# Patient Record
Sex: MALE | Race: WHITE | NOT HISPANIC OR LATINO | Employment: OTHER | URBAN - METROPOLITAN AREA
[De-identification: names, ages, dates, MRNs, and addresses within clinical notes are randomized per-mention and may not be internally consistent; named-entity substitution may affect disease eponyms.]

---

## 2018-04-10 ENCOUNTER — OFFICE VISIT CONVERTED (OUTPATIENT)
Dept: CARDIOLOGY | Facility: CLINIC | Age: 73
End: 2018-04-10
Attending: SPECIALIST

## 2018-10-12 ENCOUNTER — OFFICE VISIT CONVERTED (OUTPATIENT)
Dept: CARDIOLOGY | Facility: CLINIC | Age: 73
End: 2018-10-12
Attending: SPECIALIST

## 2019-04-12 ENCOUNTER — OFFICE VISIT CONVERTED (OUTPATIENT)
Dept: CARDIOLOGY | Facility: CLINIC | Age: 74
End: 2019-04-12
Attending: SPECIALIST

## 2019-04-12 ENCOUNTER — CONVERSION ENCOUNTER (OUTPATIENT)
Dept: OTHER | Facility: HOSPITAL | Age: 74
End: 2019-04-12

## 2019-10-15 ENCOUNTER — CONVERSION ENCOUNTER (OUTPATIENT)
Dept: CARDIOLOGY | Facility: CLINIC | Age: 74
End: 2019-10-15

## 2019-10-15 ENCOUNTER — OFFICE VISIT CONVERTED (OUTPATIENT)
Dept: CARDIOLOGY | Facility: CLINIC | Age: 74
End: 2019-10-15
Attending: SPECIALIST

## 2020-07-17 ENCOUNTER — OFFICE VISIT CONVERTED (OUTPATIENT)
Dept: CARDIOLOGY | Facility: CLINIC | Age: 75
End: 2020-07-17
Attending: SPECIALIST

## 2020-11-19 ENCOUNTER — OFFICE VISIT CONVERTED (OUTPATIENT)
Dept: UROLOGY | Facility: CLINIC | Age: 75
End: 2020-11-19
Attending: NURSE PRACTITIONER

## 2021-01-21 ENCOUNTER — HOSPITAL ENCOUNTER (OUTPATIENT)
Dept: OTHER | Facility: HOSPITAL | Age: 76
Discharge: HOME | End: 2021-01-21
Attending: INTERNAL MEDICINE

## 2021-02-18 ENCOUNTER — HOSPITAL ENCOUNTER (OUTPATIENT)
Dept: VACCINE CLINIC | Facility: HOSPITAL | Age: 76
Discharge: HOME | End: 2021-02-18
Attending: INTERNAL MEDICINE

## 2021-03-04 ENCOUNTER — CONVERSION ENCOUNTER (OUTPATIENT)
Dept: SURGERY | Facility: CLINIC | Age: 76
End: 2021-03-04

## 2021-03-04 ENCOUNTER — OFFICE VISIT CONVERTED (OUTPATIENT)
Dept: UROLOGY | Facility: CLINIC | Age: 76
End: 2021-03-04
Attending: NURSE PRACTITIONER

## 2021-03-04 LAB
BILIRUB UR QL STRIP: NEGATIVE
COLOR UR: YELLOW
CONV BACTERIA IN URINE MICRO: 0
CONV CALCIUM OXALATE CRYSTALS /HPF IN URINE SEDIMENT BY MICROSCOPY: 0
CONV CLARITY OF URINE: CLEAR
CONV PROTEIN IN URINE BY AUTOMATED TEST STRIP: NEGATIVE
CONV UROBILINOGEN IN URINE BY AUTOMATED TEST STRIP: 0.2
GLUCOSE UR QL: NEGATIVE
HGB UR QL STRIP: NEGATIVE
KETONES UR QL STRIP: NEGATIVE
LEUKOCYTE ESTERASE UR QL STRIP: NEGATIVE
NITRITE UR QL STRIP: NEGATIVE
PH UR STRIP.AUTO: 6 [PH]
RBC #/AREA URNS HPF: 0 /[HPF]
RENAL EPI CELLS #/AREA URNS HPF: 0 /[HPF]
SP GR UR: >=1.03
SQUAMOUS SPT QL MICRO: 0
WBC #/AREA URNS HPF: 0 /[HPF]

## 2021-05-10 NOTE — H&P
History and Physical      Patient Name: Jordan Gastelum   Patient ID: [id number]   Sex: Male   YOB: 1945    Primary Care Provider: Zuri MATHIS   Referring Provider: Zuri MATHIS    Visit Date: November 19, 2020    Provider: FELICIANO Philip   Location: [facility]   Location Address: [address] [place] [address]  [id number]   Location Phone: [phone]          Chief Complaint  · pt here for urological concerns      History Of Present Illness  The patient is a 75 year old /White male, who presents on referral from Zuri MATHIS, for an urological evaluation for an elevated PSA. The PSA was noted as elevated approximately 5 months ago and stated to be at a level of 8.3 . A repeat PSA on 10/27/2020 is still elevated at 7.2 ng/dl.   The patient also reports slowing of his stream and nocturia, 3-4 times a night. Additionally, he denies burning, frequency, hematuria, hesitancy, and incomplete emptying.   The patient's current medications are notable for Flomax.   The patient's past medical history is notable for UTI's.   Petinent studies:  To date, no diagnostic studies have been performed. He has not had any recent care for this condition.      She had had a severe urinary tract infection in 6/3/2020 which landed him in the emergency department.  The patient received IV antibiotics and oral antibiotics x14 days for this infection.    The patient's PSA level was checked on 6/25/2020 which revealed a level of 8.3 ng/mL    His PSA level was rechecked again on 9/24/2020 which revealed a value of 6.4 ng/mL.  Another PSA level was checked a month later on 10/27/2020 which was 7.2 ng/mL.           Past Medical History  Disease Name Date Onset Notes   Diabetes --  --    Heart Disease --  --    High blood pressure --  --          Past Surgical History  Procedure Name Date Notes   *Metal Implant --  --          Medication List  Name Date Started Instructions    aspirin 81 mg oral tablet,delayed release (DR/EC)  take 1 tablet (81 mg) by oral route once daily   atorvastatin 80 mg oral tablet  take 1 tablet (80 mg) by oral route once daily   benazepril 40 mg oral tablet  take 1 tablet (40 mg) by oral route once daily   carvedilol 12.5 mg oral tablet  take 1 tablet (12.5 mg) by oral route 2 times per day with food   CoQ-10 100 mg oral capsule  take 1 capsule by oral route daily   Fish Oil oral  --    metformin 850 mg oral tablet  take 1 tablet (850 mg) by oral route 2 times per day with morning and evening meals   multivitamin oral  --    tamsulosin 0.4 mg oral capsule  take 1 capsule (0.4 mg) by oral route once daily 1/2 hour following the same meal each day         Allergy List  Allergen Name Date Reaction Notes   prednisone --  --  depression       Allergies Reconciled  Family Medical History  Disease Name Relative/Age Notes   Diabetes, unspecified type Grandfather (maternal)/  Mother/   Mother; Grandfather (maternal); Aunt (maternal)   Family history of breast cancer  Aunt         Social History  Finding Status Start/Stop Quantity Notes   Alcohol Current some day --/-- --  drinks rarely   Caffeine Current some day --/-- --  drinks occasionally   Second hand smoke exposure Never --/-- --  no   Tobacco Former [age] --  former smoker; started smoking at age 22; quit smoking at age 32; smoked 1 cigarette(s) per day         Review of Systems  · Constitutional  o Denies  o : fever, chills  · HENT  o Denies  o : sore throat, nasal congestion, nasal discharge, sinus pain, headaches  · Cardiovascular  o Denies  o : chest pain, cardiac murmurs, irregular heart beats, dyspnea on exertion  · Respiratory  o Denies  o : shortness of breath, wheezing  · Gastrointestinal  o Denies  o : nausea, vomiting, change in abdominal girth, diarrhea, constipation, blood in stools  · Genitourinary  o Denies  o : additional symptoms, except as noted in HPI  · Integument  o Denies  o : rash, itching,  "new skin lesions  · Neurologic  o Denies  o : tingling or numbness, incoordination, seizures  · Endocrine  o Denies  o : polyuria, polydipsia, cold intolerance, heat intolerance, weight gain, weight loss  · Psychiatric  o Denies  o : anxiety, depression      Vitals  Date Time BP Position Site L\R Cuff Size HR RR TEMP (F) WT  HT  BMI kg/m2 BSA m2 O2 Sat FR L/min FiO2        11/19/2020 09:44 AM       16  252lbs 16oz 5'  9.5\" 36.83 2.37             Physical Examination  · Constitutional  o Appearance  o : Well nourished, well developed patient in no acute distress. Ambulating without difficulty.  · Head and Face  o Head  o :   § Inspection  § : atraumatic, normocephalic  o Face  o :   § Inspection  § : no facial lesions  · Eyes  o Sclerae  o : sclerae white  · Ears, Nose, Mouth and Throat  o Ears  o :   § External Ears  § : appearance within normal limits, no lesions present  o Nose  o :   § External Nose  § : appearance normal  · Neck  o Inspection/Palpation  o : normal appearance, trachea midline  · Respiratory  o Respiratory Effort  o : Breathing is unlabored without accessory muscle use  o Inspection of Chest  o : normal appearance, no retractions  · Genitourinary  o Digital Rectal Examination  o :   § Tone and Masses  § : normal sphincter tone, no rectal masses present  § Prostate  § : nontender to palpation, size normal, no nodules present, boggy consistency.  § Seminal Vesicles  § : normal size and symmetry without masses or tenderness  · Skin and Subcutaneous Tissue  o General Inspection  o : No rashes, lesions or areas of discoloration present. Skin turgor is normal.  o General Palpation  o : No abnormalities, masses or tenderness on palpation.  · Neurologic  o Mental Status Examination  o :   § Orientation  § : grossly oriented to person, place and time  § Speech/Language  § : communication ability within normal limits  o Gait and Station  o : normal gait, able to stand without " difficulty  · Psychiatric  o Judgement and Insight  o : judgment and insight intact, judgement for everyday activities and social situations within normal limits, insight intact  o Mood and Affect  o : mood normal, affect appropriate          Results  · In-Office Procedures  o Lab procedure  § Automated dipstick urinalysis with microscopy (86447)   § Color Ur: Yellow   § Clarity Ur: Clear   § Glucose Ur Ql Strip: Negative   § Bilirub Ur Ql Strip: Negative   § Ketones Ur Ql Strip: Negative   § Sp Gr Ur Qn: 1.020   § Hgb Ur Ql Strip: Negative   § pH Ur-LsCnc: 7.0   § Prot Ur Ql Strip: Negative   § Urobilinogen Ur Strip-mCnc: 0.2   § Nitrite Ur Ql Strip: Negative   § WBC Est Ur Ql Strip: Negative   § RBC UrnS Qn HPF: 0   § WBC UrnS Qn HPF: 0   § Bacteria UrnS Qn HPF: 0   § Crystals UrnS Qn HPF: 0   § Epithelial Cells (non renal): 0 /HPF  § Epithelial Cells (renal): 0   o Surgical procedure  § IOP - Bladder Scan/Residual Urine (26029)   § Specimen vol Ur: 0       Assessment  · Elevated prostate specific antigen (PSA)     790.93/R97.20      Plan  · Medications  o doxycycline hyclate 100 mg oral capsule   SIG: take 1 capsule (100 mg) by oral route 2 times per day for 28 days   DISP: (56) Capsule with 0 refills  Prescribed on 11/19/2020     o Florajen3 460 mg (7.5-6- 1.5 bill. cell) oral capsule   SIG: take 1 capsule by oral route 2 times a day for 28 days   DISP: (56) Capsule with 0 refills  Prescribed on 11/19/2020     o finasteride 5 mg oral tablet   SIG: take 1 tablet (5 mg) by oral route once daily for 90 days   DISP: (90) Tablet with 4 refills  Prescribed on 11/19/2020     o Medications have been Reconciled  o Transition of Care or Provider Policy  · Instructions  o DISCUSSION AND PLAN:  o The patient's PSA is elevated on initial exam. I have discussed the causes of an elevated PSA. I have made recommendations and I have the patient return in follow-up for a recheck of the PSA.  o We will treat patient for  prostatitis and recheck PSA in 6 months will f/u in office in 3 months to evaluate symptoms or sooner if needed.  o Repeat PSA in 6 months.  o Electronically Identified Patient Education Materials Provided Electronically            Electronically Signed by: FELICIANO Philip -Author on November 19, 2020 10:32:48 AM

## 2021-05-13 NOTE — PROGRESS NOTES
"   Progress Note      Patient Name: Jordan Gastelum   Patient ID: [id number]   Sex: Male   YOB: 1945    Primary Care Provider: Zuri MATHIS   Referring Provider: Zuri MATHIS    Visit Date: July 17, 2020    Provider: Chacorta Streeter MD   Location: [facility]   Location Address: [address] [place] [address], [facility]   [place]  [id number]   Location Phone: [phone]          Chief Complaint  · PVCs  · Palpitations       History Of Present Illness  Jordan Gastelum is a 74 year old /White male with a history of palpitations and PVCs. No chest pain or shortness of breath.   CURRENT MEDICATIONS: include Metformin 850 mg b.i.d; Atorvastatin 80 mg daily; Tamsulosin 0.4 mg daily; Carvedilol 12.5 mg b.i.d.; Benazepril 40 mg daily; ASA 81 mg daily. The dosage and frequency of the medications were reviewed with the patient.   PAST MEDICAL HISTORY: Hypertension; hyperlipidemia; diabetes mellitus; palpitations; PVCs.   FAMILY HISTORY: Positive for diabetes, hypertension and heart disease.   PSYCHOSOCIAL HISTORY: No history of mood changes or depression. He rarely drinks alcohol. He previously used tobacco but quit.       Review of Systems  · Cardiovascular  o Admits  o : palpitations (fast, fluttering, or skipping beats)  o Denies  o : swelling (feet, ankles, hands), shortness of breath while walking or lying flat, chest pain or angina pectoris   · Respiratory  o Denies  o : chronic or frequent cough, asthma or wheezing      Vitals  Date Time BP Position Site L\R Cuff Size HR RR TEMP (F) WT  HT  BMI kg/m2 BSA m2 O2 Sat HC       07/17/2020 11:50 /76 Sitting    66 - R   248lbs 0oz 5'  10\" 35.58 2.36     07/17/2020 11:50 /80 Sitting                     Physical Examination  · Constitutional  o Appearance  o : Awake, alert, cooperative, pleasant.  · Respiratory  o Inspection of Chest  o : No chest wall deformities, moving equal.  o Auscultation of Lungs  o : Good " air entry with vesicular breath sounds.  · Cardiovascular  o Heart  o :   § Auscultation of Heart  § : S1 and S2 regular. No S3. No S4. No murmurs.  o Peripheral Vascular System  o :   § Extremities  § : Peripheral pulses were well felt. No edema. No cyanosis.  · Gastrointestinal  o Abdominal Examination  o : No masses or organomegaly noted.          Assessment     ASSESSMENT AND PLAN:    1.  Palpitations, PVCs, stable:  Continue current dose of Carvedilol.  2.  Essential hypertension controlled:  Continue current dose of Benazepril, managed by his PMD.  3.  See me back in 6 months.    Chacorta Streeter MD, [facility]  CASTRO/lila           This note was transcribed by Lynnette Pina.  lila/CASTRO  The above service was transcribed by Lynnette Pina, and I attest to the accuracy of the note.  CASTRO               Electronically Signed by: Lynnette Pina-, -Author on July 21, 2020 01:10:57 PM  Electronically Co-signed by: Chacorta Streeter MD -Reviewer on July 24, 2020 08:40:24 AM

## 2021-05-14 VITALS — WEIGHT: 253 LBS | BODY MASS INDEX: 37.47 KG/M2 | RESPIRATION RATE: 16 BRPM | HEIGHT: 69 IN

## 2021-05-14 VITALS — WEIGHT: 252.37 LBS | HEIGHT: 70 IN | BODY MASS INDEX: 36.13 KG/M2 | RESPIRATION RATE: 14 BRPM

## 2021-05-14 NOTE — PROGRESS NOTES
Progress Note      Patient Name: Jordan Gastelum   Patient ID: [id number]   Sex: Male   YOB: 1945    Primary Care Provider: Zuri MATHIS    Visit Date: March 4, 2021    Provider: FELICIANO Philip   Location: [facility]   Location Address: [address] [place] [address]  [id number]   Location Phone: [phone]          Chief Complaint  · pt here for urological concerns      History Of Present Illness  The patient is a 75 year old /White male , who presents for f/u on elevated PSA and prostatitis.      Patient states he is much better     His nocturia is down to 1-2x a night     he is with no hesitancy or discomfort any longer     no straining with urination     Previous:  He had had a severe urinary tract infection in 6/3/2020 which landed him in the emergency department.  The patient received IV antibiotics and oral antibiotics x14 days for this infection.    The patient's PSA level was checked on 6/25/2020 which revealed a level of 8.3 ng/mL    His PSA level was rechecked again on 9/24/2020 which revealed a value of 6.4 ng/mL.  Another PSA level was checked a month later on 10/27/2020 which was 7.2 ng/mL.           Past Medical History  Diabetes; Heart Disease; High blood pressure         Past Surgical History  *Metal Implant         Medication List  aspirin 81 mg oral tablet,delayed release (DR/EC); atorvastatin 80 mg oral tablet; benazepril 40 mg oral tablet; carvedilol 12.5 mg oral tablet; CoQ-10 100 mg oral capsule; finasteride 5 mg oral tablet; Fish Oil oral; metformin 850 mg oral tablet; multivitamin oral; tamsulosin 0.4 mg oral capsule         Allergy List  prednisone       Allergies Reconciled  Family Medical History  Diabetes, unspecified type; Family history of breast cancer         Social History  Alcohol (Current some day); Caffeine (Current some day); Second hand smoke exposure (Never); Tobacco (Former)         Review of  "Systems  · Constitutional  o Denies  o : fever, chills  · HENT  o Denies  o : sore throat, nasal congestion, nasal discharge, sinus pain, headaches  · Cardiovascular  o Denies  o : chest pain, cardiac murmurs, irregular heart beats, dyspnea on exertion  · Respiratory  o Denies  o : shortness of breath, wheezing  · Gastrointestinal  o Denies  o : nausea, vomiting, change in abdominal girth, diarrhea, constipation, blood in stools  · Genitourinary  o Denies  o : additional symptoms, except as noted in HPI  · Integument  o Denies  o : rash, itching, new skin lesions  · Neurologic  o Denies  o : tingling or numbness, incoordination, seizures  · Endocrine  o Denies  o : polyuria, polydipsia, cold intolerance, heat intolerance, weight gain, weight loss  · Psychiatric  o Denies  o : anxiety, depression      Vitals  Date Time BP Position Site L\R Cuff Size HR RR TEMP (F) WT  HT  BMI kg/m2 BSA m2 O2 Sat FR L/min FiO2        03/04/2021 08:34 AM       14  252lbs 6oz 5'  10\" 36.21 2.38             Physical Examination  · Constitutional  o Appearance  o : Well nourished, well developed patient in no acute distress. Ambulating without difficulty.  · Head and Face  o Head  o :   § Inspection  § : atraumatic, normocephalic  o Face  o :   § Inspection  § : no facial lesions  · Eyes  o Sclerae  o : sclerae white  · Ears, Nose, Mouth and Throat  o Ears  o :   § External Ears  § : appearance within normal limits, no lesions present  o Nose  o :   § External Nose  § : appearance normal  · Neck  o Inspection/Palpation  o : normal appearance, trachea midline  · Respiratory  o Respiratory Effort  o : Breathing is unlabored without accessory muscle use  o Inspection of Chest  o : normal appearance, no retractions  · Skin and Subcutaneous Tissue  o General Inspection  o : No rashes, lesions or areas of discoloration present. Skin turgor is normal.  o General Palpation  o : No abnormalities, masses or tenderness on " palpation.  · Neurologic  o Mental Status Examination  o :   § Orientation  § : grossly oriented to person, place and time  § Speech/Language  § : communication ability within normal limits  o Gait and Station  o : normal gait, able to stand without difficulty  · Psychiatric  o Judgement and Insight  o : judgment and insight intact, judgement for everyday activities and social situations within normal limits, insight intact  o Mood and Affect  o : mood normal, affect appropriate          Results  · In-Office Procedures  o Lab procedure  § Automated dipstick urinalysis with microscopy ([id number])   § Color Ur: Yellow   § Clarity Ur: Clear   § Glucose Ur Ql Strip: Negative   § Bilirub Ur Ql Strip: Negative   § Ketones Ur Ql Strip: Negative   § Sp Gr Ur Qn: >=1.030   § Hgb Ur Ql Strip: Negative   § pH Ur-LsCnc: 6.0   § Prot Ur Ql Strip: Negative   § Urobilinogen Ur Strip-mCnc: 0.2   § Nitrite Ur Ql Strip: Negative   § WBC Est Ur Ql Strip: Negative   § RBC UrnS Qn HPF: 0   § WBC UrnS Qn HPF: 0   § Bacteria UrnS Qn HPF: 0   § Crystals UrnS Qn HPF: 0   § Epithelial Cells (non renal): 0 /HPF  § Epithelial Cells (renal): 0       Assessment  · Elevated prostate specific antigen (PSA)     790.93/R97.20      Plan  · Medications  o Medications have been Reconciled  o Transition of Care or Provider Policy  · Instructions  o DISCUSSION AND PLAN:  o The patient will continue finasteride and follow up in clinic as scheduled with repeat PSA if he is with any S/S of a UTI or prostatitis he is to call the office and f/u sooner.  o Repeat PSA in 6 months.            Electronically Signed by: FELICIANO Philip -Author on March 4, 2021 09:12:28 AM

## 2021-05-15 VITALS
HEIGHT: 70 IN | HEART RATE: 59 BPM | SYSTOLIC BLOOD PRESSURE: 150 MMHG | DIASTOLIC BLOOD PRESSURE: 71 MMHG | WEIGHT: 255.37 LBS | BODY MASS INDEX: 36.56 KG/M2

## 2021-05-15 VITALS
DIASTOLIC BLOOD PRESSURE: 76 MMHG | WEIGHT: 248 LBS | HEIGHT: 70 IN | SYSTOLIC BLOOD PRESSURE: 152 MMHG | HEART RATE: 66 BPM | BODY MASS INDEX: 35.5 KG/M2

## 2021-05-15 VITALS
HEART RATE: 64 BPM | SYSTOLIC BLOOD PRESSURE: 198 MMHG | DIASTOLIC BLOOD PRESSURE: 100 MMHG | BODY MASS INDEX: 36.08 KG/M2 | WEIGHT: 252 LBS | HEIGHT: 70 IN

## 2021-05-16 VITALS
HEIGHT: 70 IN | DIASTOLIC BLOOD PRESSURE: 94 MMHG | BODY MASS INDEX: 36.79 KG/M2 | SYSTOLIC BLOOD PRESSURE: 166 MMHG | HEART RATE: 56 BPM | WEIGHT: 257 LBS

## 2021-05-16 VITALS
HEIGHT: 70 IN | HEART RATE: 56 BPM | BODY MASS INDEX: 36.65 KG/M2 | DIASTOLIC BLOOD PRESSURE: 100 MMHG | WEIGHT: 256 LBS | SYSTOLIC BLOOD PRESSURE: 172 MMHG

## 2021-08-09 ENCOUNTER — LAB (OUTPATIENT)
Dept: LAB | Facility: HOSPITAL | Age: 76
End: 2021-08-09

## 2021-08-09 ENCOUNTER — TRANSCRIBE ORDERS (OUTPATIENT)
Dept: LAB | Facility: HOSPITAL | Age: 76
End: 2021-08-09

## 2021-08-09 DIAGNOSIS — R97.20 ELEVATED PROSTATE SPECIFIC ANTIGEN (PSA): Primary | ICD-10-CM

## 2021-08-09 DIAGNOSIS — R97.20 ELEVATED PROSTATE SPECIFIC ANTIGEN (PSA): ICD-10-CM

## 2021-08-09 LAB — PSA SERPL-MCNC: 2.46 NG/ML (ref 0–4)

## 2021-08-09 PROCEDURE — 36415 COLL VENOUS BLD VENIPUNCTURE: CPT

## 2021-08-09 PROCEDURE — G0103 PSA SCREENING: HCPCS

## 2021-08-19 ENCOUNTER — OFFICE VISIT (OUTPATIENT)
Dept: UROLOGY | Facility: CLINIC | Age: 76
End: 2021-08-19

## 2021-08-19 VITALS — HEIGHT: 70 IN | BODY MASS INDEX: 36.33 KG/M2 | RESPIRATION RATE: 18 BRPM | WEIGHT: 253.8 LBS

## 2021-08-19 DIAGNOSIS — R97.20 ELEVATED PROSTATE SPECIFIC ANTIGEN (PSA): Primary | ICD-10-CM

## 2021-08-19 LAB
BILIRUB BLD-MCNC: NEGATIVE MG/DL
CLARITY, POC: CLEAR
COLOR UR: YELLOW
GLUCOSE UR STRIP-MCNC: NEGATIVE MG/DL
KETONES UR QL: NEGATIVE
LEUKOCYTE EST, POC: NEGATIVE
NITRITE UR-MCNC: NEGATIVE MG/ML
PH UR: 6 [PH] (ref 5–8)
PROT UR STRIP-MCNC: NEGATIVE MG/DL
RBC # UR STRIP: ABNORMAL /UL
SP GR UR: 1.03 (ref 1–1.03)
UROBILINOGEN UR QL: NORMAL

## 2021-08-19 PROCEDURE — 81003 URINALYSIS AUTO W/O SCOPE: CPT | Performed by: NURSE PRACTITIONER

## 2021-08-19 PROCEDURE — 99213 OFFICE O/P EST LOW 20 MIN: CPT | Performed by: NURSE PRACTITIONER

## 2021-08-19 RX ORDER — FINASTERIDE 5 MG/1
5 TABLET, FILM COATED ORAL DAILY
COMMUNITY
Start: 2021-08-09 | End: 2021-08-19 | Stop reason: SDUPTHER

## 2021-08-19 RX ORDER — BENAZEPRIL HYDROCHLORIDE 40 MG/1
40 TABLET, FILM COATED ORAL DAILY
COMMUNITY
Start: 2021-07-28

## 2021-08-19 RX ORDER — ASPIRIN 81 MG/1
TABLET ORAL
COMMUNITY

## 2021-08-19 RX ORDER — CHLORAL HYDRATE 500 MG
CAPSULE ORAL
COMMUNITY

## 2021-08-19 RX ORDER — TAMSULOSIN HYDROCHLORIDE 0.4 MG/1
CAPSULE ORAL
COMMUNITY

## 2021-08-19 RX ORDER — CARVEDILOL 12.5 MG/1
12.5 TABLET ORAL 2 TIMES DAILY
COMMUNITY
Start: 2021-06-11

## 2021-08-19 RX ORDER — MULTIPLE VITAMINS W/ MINERALS TAB 9MG-400MCG
TAB ORAL
COMMUNITY

## 2021-08-19 RX ORDER — ATORVASTATIN CALCIUM 80 MG/1
TABLET, FILM COATED ORAL
COMMUNITY

## 2021-08-19 RX ORDER — FINASTERIDE 5 MG/1
5 TABLET, FILM COATED ORAL DAILY
Qty: 90 TABLET | Refills: 3 | Status: SHIPPED | OUTPATIENT
Start: 2021-08-19 | End: 2022-02-18

## 2021-08-19 RX ORDER — BLOOD SUGAR DIAGNOSTIC
1 STRIP MISCELLANEOUS DAILY
COMMUNITY
Start: 2021-07-09

## 2021-08-19 RX ORDER — UBIDECARENONE 100 MG
CAPSULE ORAL
COMMUNITY

## 2021-08-19 NOTE — PROGRESS NOTES
"Chief Complaint: Follow-up (6 month) and Elevated PSA    Subjective         History of Present Illness  Jordan Gastelum is a 76 y.o. male presents to [facility] to be seen for Follow-up on elevated PSA.    Patient states he is with no further pain.    Nocturia x1-2    No hesitancy or straining with urination.    No UTIs or gross hematuria since we last saw him.    Most recent PSA is 2.4.    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   • OTHER SURGICAL HISTORY      broken fingers right hand, had pins   • OTHER SURGICAL HISTORY      Rt ankle, metal plate on each side, 11 screws         Current Outpatient Medications:   •  aspirin (aspirin) 81 MG EC tablet, aspirin 81 mg oral tablet,delayed release (DR/EC) take 1 tablet (81 mg) by oral route once daily   Active, Disp: , Rfl:   •  atorvastatin (LIPITOR) 80 MG tablet, atorvastatin 80 mg oral tablet take 1 tablet (80 mg) by oral route once daily   Active, Disp: , Rfl:   •  benazepril (LOTENSIN) 40 MG tablet, Take 40 mg by mouth Daily., Disp: , Rfl:   •  carvedilol (COREG) 12.5 MG tablet, Take 12.5 mg by mouth 2 (Two) Times a Day., Disp: , Rfl:   •  coenzyme Q10 100 MG capsule, CoQ-10 100 mg oral capsule take 1 capsule by oral route daily   Active, Disp: , Rfl:   •  finasteride (PROSCAR) 5 MG tablet, Take 1 tablet by mouth Daily., Disp: 90 tablet, Rfl: 3  •  metFORMIN (GLUCOPHAGE) 850 MG tablet, metformin 850 mg oral tablet take 1 tablet (850 mg) by oral route 2 times per day with morning and evening meals   Active, Disp: , Rfl:   •  multivitamin with minerals (MULTIVITAMIN ADULT PO), , Disp: , Rfl:   •  " "Omega-3 Fatty Acids (fish oil) 1000 MG capsule capsule, , Disp: , Rfl:   •  OneTouch Ultra test strip, 1 each by Other route Daily., Disp: , Rfl:   •  tamsulosin (FLOMAX) 0.4 MG capsule 24 hr capsule, tamsulosin 0.4 mg oral capsule take 1 capsule (0.4 mg) by oral route once daily 1/2 hour following the same meal each day   Active, Disp: , Rfl:     Allergies   Allergen Reactions   • Prednisone Mental Status Change        Family History   Problem Relation Age of Onset   • Diabetes Mother         Unspecified Type   • Diabetes Maternal Grandfather         Unspecified Type   • Breast cancer Other    • Diabetes Maternal Aunt         Unspecified Type       Social History     Socioeconomic History   • Marital status:      Spouse name: Not on file   • Number of children: Not on file   • Years of education: Not on file   • Highest education level: Not on file   Tobacco Use   • Smoking status: Former Smoker     Types: Cigarettes     Quit date:      Years since quittin.6   • Smokeless tobacco: Never Used   • Tobacco comment: Age: [age] smoked 1 (s) per day. No second hand smoke exposure.   Vaping Use   • Vaping Use: Never used   Substance and Sexual Activity   • Alcohol use: Yes     Comment: Current some day: Drinks rarely: Caffeine: Current some day: drinks occasionally   • Drug use: Never   • Sexual activity: Defer       Vital Signs:   Resp 18   Ht 177.8 cm (70\")   Wt 115 kg (253 lb 12.8 oz)   BMI 36.42 kg/m²      Physical Exam     Result Review :   The following data was reviewed by: FELICIANO Hernandez on 2021:  Results for orders placed or performed in visit on 21   POC Urinalysis Dipstick, Automated    Specimen: Urine   Result Value Ref Range    Color Yellow Yellow, Straw, Dark Yellow, Candace    Clarity, UA Clear Clear    Specific Gravity  1.030 1.005 - 1.030    pH, Urine 6.0 5.0 - 8.0    Leukocytes Negative Negative    Nitrite, UA Negative Negative    Protein, POC Negative Negative mg/dL "    Glucose, UA Negative Negative, 1000 mg/dL (3+) mg/dL    Ketones, UA Negative Negative    Urobilinogen, UA Normal Normal    Bilirubin Negative Negative    Blood, UA Trace (A) Negative      PSA    PSA 8/9/21   PSA 2.460               Procedures        Assessment and Plan    Diagnoses and all orders for this visit:    1. Elevated prostate specific antigen (PSA) (Primary)  -     POC Urinalysis Dipstick, Automated  -     PSA DIAGNOSTIC; Future  -     finasteride (PROSCAR) 5 MG tablet; Take 1 tablet by mouth Daily.  Dispense: 90 tablet; Refill: 3      Patient to continue finasteride 5 mg daily.  He is doing well at this point in time.  PSA is now within normal limits.  We will follow-up with him in 6 months with a repeat PSA if still within normal limits at that point in time we will follow-up with him annually.    I spent 10 minutes caring for Jordan on this date of service. This time includes time spent by me in the following activities:reviewing tests, obtaining and/or reviewing a separately obtained history, performing a medically appropriate examination and/or evaluation , counseling and educating the patient/family/caregiver, ordering medications, tests, or procedures, and documenting information in the medical record  Follow Up   Return in about 6 months (around 2/19/2022) for f/u elevated PSA .  Patient was given instructions and counseling regarding his condition or for health maintenance advice. Please see specific information pulled into the AVS if appropriate.         This document has been electronically signed by FELICIANO Hernandez  August 19, 2021 08:47 EDT

## 2021-09-15 ENCOUNTER — TELEPHONE (OUTPATIENT)
Dept: UROLOGY | Facility: CLINIC | Age: 76
End: 2021-09-15

## 2021-09-15 NOTE — TELEPHONE ENCOUNTER
Patient said he had PSA done on 08/09 at [facility], and saw Armida on 08/19.  Please verify with patient.  I have marked another chart for merge to check for duplicate accounts.

## 2021-10-15 ENCOUNTER — OFFICE VISIT (OUTPATIENT)
Dept: CARDIOLOGY | Facility: CLINIC | Age: 76
End: 2021-10-15

## 2021-10-15 VITALS
DIASTOLIC BLOOD PRESSURE: 94 MMHG | HEART RATE: 62 BPM | SYSTOLIC BLOOD PRESSURE: 186 MMHG | WEIGHT: 252 LBS | HEIGHT: 70 IN | BODY MASS INDEX: 36.08 KG/M2

## 2021-10-15 DIAGNOSIS — R00.2 PALPITATIONS: Primary | ICD-10-CM

## 2021-10-15 DIAGNOSIS — I10 HYPERTENSION, ESSENTIAL: ICD-10-CM

## 2021-10-15 DIAGNOSIS — E78.2 HYPERLIPEMIA, MIXED: ICD-10-CM

## 2021-10-15 PROCEDURE — 99214 OFFICE O/P EST MOD 30 MIN: CPT | Performed by: SPECIALIST

## 2021-10-15 PROCEDURE — 93000 ELECTROCARDIOGRAM COMPLETE: CPT | Performed by: SPECIALIST

## 2021-10-15 RX ORDER — AMLODIPINE BESYLATE 5 MG/1
5 TABLET ORAL DAILY
Qty: 90 TABLET | Refills: 3 | Status: SHIPPED | OUTPATIENT
Start: 2021-10-15 | End: 2022-05-31 | Stop reason: SDUPTHER

## 2021-10-15 NOTE — PROGRESS NOTES
[facility]  Cardiology progress Note    Patient Name: Jordan Gastelum  : 1945    CHIEF COMPLAINT  Palpitations.      Subjective   Subjective     HISTORY OF PRESENT ILLNESS    Jordan Gastelum is a 76 y.o. male with history of palpitations and hypertension.  Palpitations are stable.    Review of Systems:   Constitutional no fever,  no weight loss   Skin no rash   Otolaryngeal no difficulty swallowing   Cardiovascular See HPI   Pulmonary no cough, no sputum production   Gastrointestinal no constipation, no diarrhea   Genitourinary no dysuria, no hematuria   Hematologic no easy bruisability, no abnormal bleeding   Musculoskeletal no muscle pain   Neurologic no dizziness, no falls         Personal History     Social History:  reports that he quit smoking about 41 years ago. His smoking use included cigarettes. He has never used smokeless tobacco. He reports current alcohol use. He reports that he does not use drugs.    Home Medications:  Current Outpatient Medications on File Prior to Visit   Medication Sig   • aspirin (aspirin) 81 MG EC tablet aspirin 81 mg oral tablet,delayed release (DR/EC) take 1 tablet (81 mg) by oral route once daily   Active   • atorvastatin (LIPITOR) 80 MG tablet atorvastatin 80 mg oral tablet take 1 tablet (80 mg) by oral route once daily   Active   • benazepril (LOTENSIN) 40 MG tablet Take 40 mg by mouth Daily.   • carvedilol (COREG) 12.5 MG tablet Take 12.5 mg by mouth 2 (Two) Times a Day.   • coenzyme Q10 100 MG capsule CoQ-10 100 mg oral capsule take 1 capsule by oral route daily   Active   • finasteride (PROSCAR) 5 MG tablet Take 1 tablet by mouth Daily.   • metFORMIN (GLUCOPHAGE) 850 MG tablet metformin 850 mg oral tablet take 1 tablet (850 mg) by oral route 2 times per day with morning and evening meals   Active   • multivitamin with minerals (MULTIVITAMIN ADULT PO)    • tamsulosin (FLOMAX) 0.4 MG capsule 24 hr capsule tamsulosin 0.4 mg oral capsule take 1 capsule  (0.4 mg) by oral route once daily 1/2 hour following the same meal each day   Active   • Omega-3 Fatty Acids (fish oil) 1000 MG capsule capsule    • OneTouch Ultra test strip 1 each by Other route Daily.     No current facility-administered medications on file prior to visit.     Allergies:  Allergies   Allergen Reactions   • Prednisone Mental Status Change       Objective    Objective       Vitals:   Heart Rate:  [62] 62  BP: (186-214)/() 186/94  Body mass index is 36.16 kg/m².     Physical Exam:   Constitutional: Awake, alert, No acute distress    Eyes: PERRLA, sclerae anicteric, no conjunctival injection   HENT: NCAT, mucous membranes moist   Neck: Supple, no thyromegaly, no lymphadenopathy, trachea midline   Respiratory: Clear to auscultation bilaterally, nonlabored respirations    Cardiovascular: RRR, no murmurs or rubs. Palpable pedal pulses bilaterally   Musculoskeletal: No bilateral ankle edema, no cyanosis to extremities   Psychiatric: Appropriate affect, cooperative   Neurologic: Oriented x 3, strength symmetric in all extremities, Cranial Nerves grossly intact to confrontation, speech clear   Skin: No rashes.    Result Review    Result Review:  I have personally reviewed the available results from  [x]  Laboratory  [x]  EKG  [x]  Cardiology  [x]  Medications  [x]  Old records  []  Other:     ECG 12 Lead    Date/Time: 10/15/2021 1:18 PM  Performed by: Chacorta Streeter MD  Authorized by: Chacorta Streeter MD   Comparison: compared with previous ECG   Similar to previous ECG  Rhythm: sinus rhythm  Other findings: non-specific ST-T wave changes    Clinical impression: abnormal EKG  Comments: Normal sinus rhythm.  No significant changes compared to previous EKG.            Impression/Plan:  1.  Palpitations/PVCs stable: Continue carvedilol 12.5 mg twice daily.  No palpitations.  2.  Hyperlipidemia: Continue Lipitor 80 mg once a day.  Lipid profile reviewed.  Able to tolerate Lipitor without any  side effects.  3.  Essential hypertension controlled: Continue amlodipine 5 mg once a day.  Continue carvedilol 12.5 mg twice daily.  Low-salt diet advised.  Able to tolerate these medicines without any side effects.           Chacorta Streeter MD   10/15/21   11:42 EDT

## 2022-02-14 ENCOUNTER — LAB (OUTPATIENT)
Dept: LAB | Facility: HOSPITAL | Age: 77
End: 2022-02-14

## 2022-02-14 DIAGNOSIS — R97.20 ELEVATED PROSTATE SPECIFIC ANTIGEN (PSA): ICD-10-CM

## 2022-02-14 LAB — PSA SERPL-MCNC: 1.55 NG/ML (ref 0–4)

## 2022-02-14 PROCEDURE — 36415 COLL VENOUS BLD VENIPUNCTURE: CPT

## 2022-02-14 PROCEDURE — 84153 ASSAY OF PSA TOTAL: CPT

## 2022-02-18 ENCOUNTER — OFFICE VISIT (OUTPATIENT)
Dept: UROLOGY | Facility: CLINIC | Age: 77
End: 2022-02-18

## 2022-02-18 VITALS — BODY MASS INDEX: 36.42 KG/M2 | RESPIRATION RATE: 16 BRPM | WEIGHT: 254.4 LBS | HEIGHT: 70 IN

## 2022-02-18 DIAGNOSIS — R97.20 ELEVATED PROSTATE SPECIFIC ANTIGEN (PSA): ICD-10-CM

## 2022-02-18 DIAGNOSIS — Z12.5 PROSTATE CANCER SCREENING: Primary | ICD-10-CM

## 2022-02-18 PROBLEM — E83.51 HYPOCALCEMIA: Status: ACTIVE | Noted: 2022-02-18

## 2022-02-18 PROBLEM — E66.9 OBESITY: Status: ACTIVE | Noted: 2022-02-18

## 2022-02-18 PROBLEM — E11.9 TYPE 2 DIABETES MELLITUS WITHOUT COMPLICATION: Status: ACTIVE | Noted: 2022-02-18

## 2022-02-18 PROBLEM — E55.9 VITAMIN D DEFICIENCY: Status: ACTIVE | Noted: 2022-02-18

## 2022-02-18 PROBLEM — E78.5 HYPERLIPIDEMIA: Status: ACTIVE | Noted: 2022-02-18

## 2022-02-18 PROBLEM — N40.0 BENIGN PROSTATIC HYPERPLASIA WITHOUT LOWER URINARY TRACT SYMPTOMS: Status: ACTIVE | Noted: 2022-02-18

## 2022-02-18 PROBLEM — R39.11 HESITANCY OF MICTURITION: Status: ACTIVE | Noted: 2022-02-18

## 2022-02-18 PROBLEM — E11.9 DIABETES: Status: ACTIVE | Noted: 2022-02-18

## 2022-02-18 PROBLEM — H55.00 NYSTAGMUS: Status: ACTIVE | Noted: 2022-02-18

## 2022-02-18 PROBLEM — N40.1 LOWER URINARY TRACT SYMPTOMS DUE TO BENIGN PROSTATIC HYPERPLASIA: Status: ACTIVE | Noted: 2022-02-18

## 2022-02-18 LAB
BILIRUB BLD-MCNC: NEGATIVE MG/DL
CLARITY, POC: CLEAR
COLOR UR: YELLOW
EXPIRATION DATE: NORMAL
GLUCOSE UR STRIP-MCNC: NEGATIVE MG/DL
KETONES UR QL: NEGATIVE
LEUKOCYTE EST, POC: NEGATIVE
Lab: NORMAL
NITRITE UR-MCNC: NEGATIVE MG/ML
PH UR: 5.5 [PH] (ref 5–8)
PROT UR STRIP-MCNC: NEGATIVE MG/DL
RBC # UR STRIP: NEGATIVE /UL
SP GR UR: 1.02 (ref 1–1.03)
URINE VOLUME: 75
UROBILINOGEN UR QL: NORMAL

## 2022-02-18 PROCEDURE — 99212 OFFICE O/P EST SF 10 MIN: CPT | Performed by: NURSE PRACTITIONER

## 2022-02-18 PROCEDURE — 51798 US URINE CAPACITY MEASURE: CPT | Performed by: NURSE PRACTITIONER

## 2022-02-18 PROCEDURE — 81003 URINALYSIS AUTO W/O SCOPE: CPT | Performed by: NURSE PRACTITIONER

## 2022-02-18 RX ORDER — ERGOCALCIFEROL 1.25 MG/1
50000 CAPSULE ORAL WEEKLY
COMMUNITY
Start: 2021-12-27

## 2022-02-18 RX ORDER — FINASTERIDE 5 MG/1
TABLET, FILM COATED ORAL
Qty: 90 TABLET | Refills: 0 | Status: SHIPPED | OUTPATIENT
Start: 2022-02-18 | End: 2022-05-23

## 2022-02-18 NOTE — PROGRESS NOTES
"Chief Complaint: Benign Prostatic Hypertrophy (elevated psa)    Subjective         History of Present Illness  Jordan Gastelum is a 76 y.o. male presents to [facility] UROLOGY to be seen for Follow-up on elevated PSA.    Patient states he is with no further pain.    Nocturia x 2-4    No hesitancy or straining with urination.    No UTIs or gross hematuria since we last saw him.    He states that he is doing well at this time and is OK with symptoms.    Most recent PSA is 1.5.    Objective     Past Medical History:   Diagnosis Date   • Diabetes (HCC)    • HBP (high blood pressure)    • Heart disease        Past Surgical History:   Procedure Laterality Date   • COLONOSCOPY     • OTHER SURGICAL HISTORY      \"Metal Implant\"   • OTHER SURGICAL HISTORY      eye surgery, repaired hole in retina right eye   • OTHER SURGICAL HISTORY      tumor removed from neck   • OTHER SURGICAL HISTORY      broken fingers right hand, had pins   • OTHER SURGICAL HISTORY      Rt ankle, metal plate on each side, 11 screws         Current Outpatient Medications:   •  amLODIPine (NORVASC) 5 MG tablet, Take 1 tablet by mouth Daily., Disp: 90 tablet, Rfl: 3  •  aspirin (aspirin) 81 MG EC tablet, aspirin 81 mg oral tablet,delayed release (DR/EC) take 1 tablet (81 mg) by oral route once daily   Active, Disp: , Rfl:   •  atorvastatin (LIPITOR) 80 MG tablet, atorvastatin 80 mg oral tablet take 1 tablet (80 mg) by oral route once daily   Active, Disp: , Rfl:   •  benazepril (LOTENSIN) 40 MG tablet, Take 40 mg by mouth Daily., Disp: , Rfl:   •  carvedilol (COREG) 12.5 MG tablet, Take 12.5 mg by mouth 2 (Two) Times a Day., Disp: , Rfl:   •  coenzyme Q10 100 MG capsule, CoQ-10 100 mg oral capsule take 1 capsule by oral route daily   Active, Disp: , Rfl:   •  finasteride (PROSCAR) 5 MG tablet, Take 1 tablet by mouth Daily., Disp: 90 tablet, Rfl: 3  •  metFORMIN (GLUCOPHAGE) 850 MG tablet, metformin 850 mg oral tablet take 1 tablet (850 " "mg) by oral route 2 times per day with morning and evening meals   Active, Disp: , Rfl:   •  multivitamin with minerals (MULTIVITAMIN ADULT PO), , Disp: , Rfl:   •  Omega-3 Fatty Acids (fish oil) 1000 MG capsule capsule, , Disp: , Rfl:   •  OneTouch Ultra test strip, 1 each by Other route Daily., Disp: , Rfl:   •  tamsulosin (FLOMAX) 0.4 MG capsule 24 hr capsule, tamsulosin 0.4 mg oral capsule take 1 capsule (0.4 mg) by oral route once daily 1/2 hour following the same meal each day   Active, Disp: , Rfl:   •  vitamin D (ERGOCALCIFEROL) 1.25 MG (10466 UT) capsule capsule, Take 50,000 Units by mouth 1 (One) Time Per Week., Disp: , Rfl:     Allergies   Allergen Reactions   • Prednisone Mental Status Change and Unknown - High Severity        Family History   Problem Relation Age of Onset   • Diabetes Mother         Unspecified Type   • Diabetes Maternal Grandfather         Unspecified Type   • Breast cancer Other    • Diabetes Maternal Aunt         Unspecified Type       Social History     Socioeconomic History   • Marital status:    Tobacco Use   • Smoking status: Former Smoker     Types: Cigarettes     Quit date:      Years since quittin.1   • Smokeless tobacco: Never Used   • Tobacco comment: Age: [age] smoked 1 (s) per day. No second hand smoke exposure.   Vaping Use   • Vaping Use: Never used   Substance and Sexual Activity   • Alcohol use: Yes     Comment: Current some day: Drinks rarely: Caffeine: Current some day: drinks occasionally   • Drug use: Never   • Sexual activity: Defer       Vital Signs:   Resp 16   Ht 177.8 cm (70\")   Wt 115 kg (254 lb 6.4 oz)   BMI 36.50 kg/m²      Physical Exam     Result Review :   The following data was reviewed by: FELICIANO Hernandez on 2021:  Results for orders placed or performed in visit on 22   Bladder Scan   Result Value Ref Range    Urine Volume 75    POC Urinalysis Dipstick, Automated    Specimen: Urine   Result Value Ref Range    Color " Yellow Yellow, Straw, Dark Yellow, Candace    Clarity, UA Clear Clear    Specific Gravity  1.020 1.005 - 1.030    pH, Urine 5.5 5.0 - 8.0    Leukocytes Negative Negative    Nitrite, UA Negative Negative    Protein, POC Negative Negative mg/dL    Glucose, UA Negative Negative, 1000 mg/dL (3+) mg/dL    Ketones, UA Negative Negative    Urobilinogen, UA Normal Normal    Bilirubin Negative Negative    Blood, UA Negative Negative    Lot Number [id number]     Expiration Date 2,022/12       PSA    PSA 8/9/21 2/14/22   PSA 2.460 1.550           Bladder Scan interpretation 02/18/2022    Estimation of residual urine via BVI 3000 Verathon Bladder Scan  Residual Urine: 75 ml  Indication: Prostate cancer screening    Elevated prostate specific antigen (PSA)   Position: Supine  Examination: Incremental scanning of the suprapubic area using 2.0 MHz transducer using copious amounts of acoustic gel.   Findings: An anechoic area was demonstrated which represented the bladder, with measurement of residual urine as noted. I inspected this myself. In that the residual urine was insignificant, refer to plan for treatment and plan necessary at this time.         Procedures        Assessment and Plan    Diagnoses and all orders for this visit:    1. Prostate cancer screening (Primary)  -     PSA Screen; Future    2. Elevated prostate specific antigen (PSA)  -     POC Urinalysis Dipstick, Automated  -     Bladder Scan      Patient to continue tamsulosin 0.4 mg q day and finasteride 5 mg daily.  He is doing well at this point in time.  PSA is now within normal limits.  We will follow-up with him annually with repeat PSA for several years and d/c him if this remains normal after that time.    I spent 10 minutes caring for Jordan on this date of service. This time includes time spent by me in the following activities:reviewing tests, obtaining and/or reviewing a separately obtained history, performing a medically appropriate examination and/or  evaluation , counseling and educating the patient/family/caregiver, ordering medications, tests, or procedures, and documenting information in the medical record  Follow Up   Return in about 1 year (around 2/18/2023) for annual f/u with PSA.  Patient was given instructions and counseling regarding his condition or for health maintenance advice. Please see specific information pulled into the AVS if appropriate.         This document has been electronically signed by FELICIANO Hernandez  February 18, 2022 08:33 EST

## 2022-05-23 DIAGNOSIS — R97.20 ELEVATED PROSTATE SPECIFIC ANTIGEN (PSA): ICD-10-CM

## 2022-05-23 RX ORDER — FINASTERIDE 5 MG/1
TABLET, FILM COATED ORAL
Qty: 90 TABLET | Refills: 0 | Status: SHIPPED | OUTPATIENT
Start: 2022-05-23

## 2022-05-23 NOTE — TELEPHONE ENCOUNTER
Patient asked for refills on Finasteride.  He thinks he is supposed to stay on it until he is seen again, but said only #90 was sent in February.

## 2022-05-30 NOTE — PROGRESS NOTES
[facility]  Cardiology progress Note    Patient Name: Jordan Gastelum  : 1945    CHIEF COMPLAINT  Palpitations      Subjective   Subjective     HISTORY OF PRESENT ILLNESS    Jordan Gastelum is a 76 y.o. male with history of palpitations.  No further palpitations.  No chest pain or shortness of breath    Review of Systems:   Constitutional no fever,  no weight loss   Skin no rash   Otolaryngeal no difficulty swallowing   Cardiovascular See HPI   Pulmonary no cough, no sputum production   Gastrointestinal no constipation, no diarrhea   Genitourinary no dysuria, no hematuria   Hematologic no easy bruisability, no abnormal bleeding   Musculoskeletal no muscle pain   Neurologic no dizziness, no falls         Personal History     Social History:  reports that he quit smoking about 42 years ago. His smoking use included cigarettes. He has never used smokeless tobacco. He reports current alcohol use. He reports that he does not use drugs.    Home Medications:  Current Outpatient Medications on File Prior to Visit   Medication Sig   • aspirin 81 MG EC tablet aspirin 81 mg oral tablet,delayed release (DR/EC) take 1 tablet (81 mg) by oral route once daily   Active   • atorvastatin (LIPITOR) 80 MG tablet atorvastatin 80 mg oral tablet take 1 tablet (80 mg) by oral route once daily   Active   • benazepril (LOTENSIN) 40 MG tablet Take 40 mg by mouth Daily.   • carvedilol (COREG) 12.5 MG tablet Take 12.5 mg by mouth 2 (Two) Times a Day.   • coenzyme Q10 100 MG capsule CoQ-10 100 mg oral capsule take 1 capsule by oral route daily   Active   • finasteride (PROSCAR) 5 MG tablet Take 1 tablet by mouth once daily   • metFORMIN (GLUCOPHAGE) 850 MG tablet metformin 850 mg oral tablet take 1 tablet (850 mg) by oral route 2 times per day with morning and evening meals   Active   • multivitamin with minerals tablet tablet    • Omega-3 Fatty Acids (fish oil) 1000 MG capsule capsule    • OneTouch Ultra test strip 1 each  by Other route Daily.   • tamsulosin (FLOMAX) 0.4 MG capsule 24 hr capsule tamsulosin 0.4 mg oral capsule take 1 capsule (0.4 mg) by oral route once daily 1/2 hour following the same meal each day   Active   • vitamin D (ERGOCALCIFEROL) 1.25 MG (93194 UT) capsule capsule Take 50,000 Units by mouth 1 (One) Time Per Week.   • [DISCONTINUED] amLODIPine (NORVASC) 5 MG tablet Take 1 tablet by mouth Daily.     No current facility-administered medications on file prior to visit.     Allergies:  Allergies   Allergen Reactions   • Prednisone Mental Status Change and Unknown - High Severity       Objective    Objective       Vitals:   Heart Rate:  [64] 64  BP: (164-166)/(68-72) 166/72  Body mass index is 35.15 kg/m².     Physical Exam:   Constitutional: Awake, alert, No acute distress    Eyes: PERRLA, sclerae anicteric, no conjunctival injection   HENT: NCAT, mucous membranes moist   Neck: Supple, no thyromegaly, no lymphadenopathy, trachea midline   Respiratory: Clear to auscultation bilaterally, nonlabored respirations    Cardiovascular: RRR, no murmurs or rubs. Palpable pedal pulses bilaterally   Musculoskeletal: No bilateral ankle edema, no cyanosis to extremities   Psychiatric: Appropriate affect, cooperative   Neurologic: Oriented x 3, strength symmetric in all extremities, Cranial Nerves grossly intact to confrontation, speech clear   Skin: No rashes.    Result Review    Result Review:  I have personally reviewed the available results from  [x]  Laboratory  [x]  EKG  [x]  Cardiology  [x]  Medications  [x]  Old records  []  Other:   Procedures    Impression/Plan:  1.  Palpitations/PVCs stable: Continue carvedilol 12.5 mg twice daily.  No palpitations.    2.  Hyperlipidemia: Continue Lipitor 80 mg once a day.  Lipid profile reviewed.  Able to tolerate Lipitor without any side effects.  3.  Essential hypertension controlled/whitecoat hypertension: Blood pressure well controlled at home continue amlodipine 5 mg once a day.   Continue carvedilol 12.5 mg twice daily.  Low-salt diet advised.  Able to tolerate these medicines without any side effects.           Chacorta Streeter MD   05/31/22   10:25 EDT

## 2022-05-31 ENCOUNTER — OFFICE VISIT (OUTPATIENT)
Dept: CARDIOLOGY | Facility: CLINIC | Age: 77
End: 2022-05-31

## 2022-05-31 VITALS
DIASTOLIC BLOOD PRESSURE: 72 MMHG | SYSTOLIC BLOOD PRESSURE: 166 MMHG | HEART RATE: 64 BPM | WEIGHT: 245 LBS | BODY MASS INDEX: 35.07 KG/M2 | HEIGHT: 70 IN

## 2022-05-31 DIAGNOSIS — I10 HYPERTENSION, ESSENTIAL: Primary | ICD-10-CM

## 2022-05-31 PROCEDURE — 99214 OFFICE O/P EST MOD 30 MIN: CPT | Performed by: SPECIALIST

## 2022-05-31 RX ORDER — AMLODIPINE BESYLATE 5 MG/1
5 TABLET ORAL DAILY
Qty: 90 TABLET | Refills: 3 | Status: SHIPPED | OUTPATIENT
Start: 2022-05-31

## 2022-06-22 ENCOUNTER — TELEPHONE (OUTPATIENT)
Dept: CARDIOLOGY | Facility: CLINIC | Age: 77
End: 2022-06-22

## 2022-06-22 NOTE — TELEPHONE ENCOUNTER
Procedure: Dental Extractions    Med Directive: Aspirin    PMH: hyperlipidemia, HTN, palpitations    Last Seen: 5/31/22

## 2022-11-30 ENCOUNTER — OFFICE VISIT (OUTPATIENT)
Dept: CARDIOLOGY | Facility: CLINIC | Age: 77
End: 2022-11-30

## 2022-11-30 VITALS
HEIGHT: 70 IN | HEART RATE: 59 BPM | WEIGHT: 242 LBS | DIASTOLIC BLOOD PRESSURE: 76 MMHG | BODY MASS INDEX: 34.65 KG/M2 | SYSTOLIC BLOOD PRESSURE: 130 MMHG

## 2022-11-30 DIAGNOSIS — I10 ESSENTIAL HYPERTENSION: ICD-10-CM

## 2022-11-30 DIAGNOSIS — E78.2 MIXED HYPERLIPIDEMIA: ICD-10-CM

## 2022-11-30 DIAGNOSIS — I49.3 PVC'S (PREMATURE VENTRICULAR CONTRACTIONS): Primary | ICD-10-CM

## 2022-11-30 PROCEDURE — 99214 OFFICE O/P EST MOD 30 MIN: CPT | Performed by: NURSE PRACTITIONER

## 2022-11-30 PROCEDURE — 93000 ELECTROCARDIOGRAM COMPLETE: CPT | Performed by: NURSE PRACTITIONER

## 2023-02-23 ENCOUNTER — TELEPHONE (OUTPATIENT)
Dept: UROLOGY | Facility: CLINIC | Age: 78
End: 2023-02-23
Payer: MEDICARE

## 2023-02-23 DIAGNOSIS — Z12.5 PROSTATE CANCER SCREENING: Primary | ICD-10-CM

## 2023-07-28 ENCOUNTER — LAB (OUTPATIENT)
Dept: LAB | Facility: HOSPITAL | Age: 78
End: 2023-07-28
Payer: MEDICARE

## 2023-07-28 DIAGNOSIS — Z12.5 PROSTATE CANCER SCREENING: ICD-10-CM

## 2023-07-28 LAB — PSA SERPL-MCNC: 1.57 NG/ML (ref 0–4)

## 2023-07-28 PROCEDURE — 36415 COLL VENOUS BLD VENIPUNCTURE: CPT

## 2023-07-28 PROCEDURE — G0103 PSA SCREENING: HCPCS

## 2023-08-07 ENCOUNTER — OFFICE VISIT (OUTPATIENT)
Dept: UROLOGY | Facility: CLINIC | Age: 78
End: 2023-08-07
Payer: MEDICARE

## 2023-08-07 VITALS — WEIGHT: 241.6 LBS | HEIGHT: 70 IN | RESPIRATION RATE: 12 BRPM | BODY MASS INDEX: 34.59 KG/M2

## 2023-08-07 DIAGNOSIS — N40.0 BENIGN PROSTATIC HYPERPLASIA WITHOUT LOWER URINARY TRACT SYMPTOMS: ICD-10-CM

## 2023-08-07 DIAGNOSIS — Z12.5 PROSTATE CANCER SCREENING: Primary | ICD-10-CM

## 2023-08-07 DIAGNOSIS — R97.20 ELEVATED PROSTATE SPECIFIC ANTIGEN (PSA): ICD-10-CM

## 2023-08-07 LAB
BILIRUB BLD-MCNC: NEGATIVE MG/DL
CLARITY, POC: CLEAR
COLOR UR: YELLOW
EXPIRATION DATE: NORMAL
GLUCOSE UR STRIP-MCNC: NEGATIVE MG/DL
KETONES UR QL: NEGATIVE
LEUKOCYTE EST, POC: NEGATIVE
Lab: NORMAL
NITRITE UR-MCNC: NEGATIVE MG/ML
PH UR: 7 [PH] (ref 5–8)
PROT UR STRIP-MCNC: NEGATIVE MG/DL
RBC # UR STRIP: NEGATIVE /UL
SP GR UR: 1.01 (ref 1–1.03)
UROBILINOGEN UR QL: NORMAL

## 2023-08-07 PROCEDURE — 1160F RVW MEDS BY RX/DR IN RCRD: CPT | Performed by: NURSE PRACTITIONER

## 2023-08-07 PROCEDURE — 81003 URINALYSIS AUTO W/O SCOPE: CPT | Performed by: NURSE PRACTITIONER

## 2023-08-07 PROCEDURE — 99213 OFFICE O/P EST LOW 20 MIN: CPT | Performed by: NURSE PRACTITIONER

## 2023-08-07 PROCEDURE — 1159F MED LIST DOCD IN RCRD: CPT | Performed by: NURSE PRACTITIONER

## 2023-08-07 RX ORDER — ASPIRIN 81 MG/1
TABLET ORAL EVERY 24 HOURS
COMMUNITY

## 2023-08-07 RX ORDER — METFORMIN HYDROCHLORIDE 750 MG/1
1 TABLET, EXTENDED RELEASE ORAL DAILY
COMMUNITY
Start: 2023-06-13

## 2023-08-07 NOTE — PROGRESS NOTES
"Chief Complaint: Follow-up (Pt here for annual.  Pt had PSA. )    Subjective         Benign Prostatic Hypertrophy    Jordan Gastelum is a 78 y.o. male presents to [facility] UROLOG[facility] to be seen for annual Follow-up on elevated PSA/ BPH.    Patient has remained on finasteride 5mg q day and Tamsulosin  0.4mg q day.    He states that he has had some muscle loss.     Nocturia x 2-3    No hesitancy or straining with urination.    Weak stream at night at times. Good during the day.    No UTIs or gross hematuria since we last saw him.    Most recent PSA is 1.5 7/23.    Objective     Past Medical History:   Diagnosis Date    Diabetes     Essential hypertension 11/30/2022    HBP (high blood pressure)     Heart disease     Hyperlipidemia     PVC's (premature ventricular contractions) 10/15/2021       Past Surgical History:   Procedure Laterality Date    CATARACT EXTRACTION      COLONOSCOPY      OTHER SURGICAL HISTORY      \"Metal Implant\"    OTHER SURGICAL HISTORY      eye surgery, repaired hole in retina right eye    OTHER SURGICAL HISTORY      tumor removed from neck    OTHER SURGICAL HISTORY      broken fingers right hand, had pins    OTHER SURGICAL HISTORY      Rt ankle, metal plate on each side, 11 screws         Current Outpatient Medications:     amLODIPine (NORVASC) 5 MG tablet, Take 1 tablet by mouth Daily., Disp: 90 tablet, Rfl: 3    aspirin 81 MG EC tablet, Daily., Disp: , Rfl:     atorvastatin (LIPITOR) 80 MG tablet, atorvastatin 80 mg oral tablet take 1 tablet (80 mg) by oral route once daily   Active, Disp: , Rfl:     benazepril (LOTENSIN) 40 MG tablet, Take 1 tablet by mouth Daily., Disp: , Rfl:     carvedilol (COREG) 12.5 MG tablet, Take 1 tablet by mouth 2 (Two) Times a Day., Disp: , Rfl:     coenzyme Q10 100 MG capsule, CoQ-10 100 mg oral capsule take 1 capsule by oral route daily   Active, Disp: , Rfl:     finasteride (PROSCAR) 5 MG tablet, Take 1 tablet by mouth once daily, Disp: 90 " "tablet, Rfl: 0    metFORMIN ER (GLUCOPHAGE-XR) 750 MG 24 hr tablet, Take 1 tablet by mouth Daily., Disp: , Rfl:     multivitamin with minerals tablet tablet, , Disp: , Rfl:     Omega-3 Fatty Acids (fish oil) 1000 MG capsule capsule, , Disp: , Rfl:     OneTouch Ultra test strip, 1 each by Other route Daily., Disp: , Rfl:     tamsulosin (FLOMAX) 0.4 MG capsule 24 hr capsule, tamsulosin 0.4 mg oral capsule take 1 capsule (0.4 mg) by oral route once daily 1/2 hour following the same meal each day   Active, Disp: , Rfl:     vitamin D (ERGOCALCIFEROL) 1.25 MG (79575 UT) capsule capsule, Take 1 capsule by mouth 1 (One) Time Per Week., Disp: , Rfl:     Allergies   Allergen Reactions    Prednisone Mental Status Change and Unknown - High Severity    Codeine Hives        Family History   Problem Relation Age of Onset    Diabetes Mother         Unspecified Type    Diabetes Maternal Grandfather         Unspecified Type    Breast cancer Other     Diabetes Maternal Aunt         Unspecified Type       Social History     Socioeconomic History    Marital status:    Tobacco Use    Smoking status: Former     Types: Cigarettes     Quit date:      Years since quittin.6    Smokeless tobacco: Never    Tobacco comments:     Age: [age] smoked 1 (s) per day. No second hand smoke exposure.   Vaping Use    Vaping Use: Never used   Substance and Sexual Activity    Alcohol use: Yes     Comment: Current some day: Drinks rarely: Caffeine: Current some day: drinks occasionally    Drug use: Never    Sexual activity: Defer       Vital Signs:   Resp 12   Ht 177.8 cm (70\")   Wt 110 kg (241 lb 9.6 oz)   BMI 34.67 kg/mý      Physical Exam     Result Review :   The following data was reviewed by: FELICIANO Hernandez on 2023:  Results for orders placed or performed in visit on 23   POC Urinalysis Dipstick, Automated    Specimen: Urine   Result Value Ref Range    Color Yellow Yellow, Straw, Dark Yellow, Candace    Clarity, UA " Clear Clear    Specific Gravity  1.015 1.005 - 1.030    pH, Urine 7.0 5.0 - 8.0    Leukocytes Negative Negative    Nitrite, UA Negative Negative    Protein, POC Negative Negative mg/dL    Glucose, UA Negative Negative mg/dL    Ketones, UA Negative Negative    Urobilinogen, UA Normal Normal, 0.2 E.U./dL    Bilirubin Negative Negative    Blood, UA Negative Negative    Lot Number [id number]     Expiration Date 2,024/7       PSA          7/28/2023    08:17   PSA   PSA 1.570    .         Procedures        Assessment and Plan    Diagnoses and all orders for this visit:    1. Prostate cancer screening (Primary)  -     POC Urinalysis Dipstick, Automated  -     PSA Screen; Future    2. Elevated prostate specific antigen (PSA)  -     POC Urinalysis Dipstick, Automated  -     PSA Screen; Future    3. Benign prostatic hyperplasia without lower urinary tract symptoms      Patient to continue tamsulosin 0.4 mg q day and finasteride 5 mg daily.  He is doing well at this point in time.      PSA has remained within normal limits.      We will follow-up with him annually with repeat PSA for several years and d/c him if this remains normal after that time.    I spent 10 minutes caring for Jordan on this date of service. This time includes time spent by me in the following activities:reviewing tests, obtaining and/or reviewing a separately obtained history, performing a medically appropriate examination and/or evaluation , counseling and educating the patient/family/caregiver, ordering medications, tests, or procedures, and documenting information in the medical record  Follow Up   Return in about 1 year (around 8/7/2024) for annual f/u BPH/ Elevated PSA .  Patient was given instructions and counseling regarding his condition or for health maintenance advice. Please see specific information pulled into the AVS if appropriate.         This document has been electronically signed by FELICIANO Hernandez  August 7, 2023 08:33 EDT

## 2023-11-27 NOTE — PROGRESS NOTES
[facility]  Cardiology progress Note    Patient Name: Jordan Gastelum  : 1945    CHIEF COMPLAINT  Palpitations        Subjective   Subjective     HISTORY OF PRESENT ILLNESS    Jordan Gastelum is a 78 y.o. male with history of palpitations and PVCs.  No further palpitations.    REVIEW OF SYSTEMS    Constitutional:    No fever, no weight loss  Skin:     No rash  Otolaryngeal:    No difficulty swallowing  Cardiovascular: See HPI.  Pulmonary:    No cough, no sputum production    Personal History     Social History:    reports that he quit smoking about 43 years ago. His smoking use included cigarettes. He has never used smokeless tobacco. He reports current alcohol use. He reports that he does not use drugs.    Home Medications:  Current Outpatient Medications on File Prior to Visit   Medication Sig    amLODIPine (NORVASC) 5 MG tablet Take 1 tablet by mouth Daily.    aspirin 81 MG EC tablet Daily.    atorvastatin (LIPITOR) 80 MG tablet atorvastatin 80 mg oral tablet take 1 tablet (80 mg) by oral route once daily   Active    benazepril (LOTENSIN) 40 MG tablet Take 1 tablet by mouth Daily.    carvedilol (COREG) 12.5 MG tablet Take 1 tablet by mouth 2 (Two) Times a Day.    coenzyme Q10 100 MG capsule CoQ-10 100 mg oral capsule take 1 capsule by oral route daily   Active    finasteride (PROSCAR) 5 MG tablet Take 1 tablet by mouth once daily    metFORMIN ER (GLUCOPHAGE-XR) 750 MG 24 hr tablet Take 1 tablet by mouth Daily.    multivitamin with minerals tablet tablet     Omega-3 Fatty Acids (fish oil) 1000 MG capsule capsule     OneTouch Ultra test strip 1 each by Other route Daily.    tamsulosin (FLOMAX) 0.4 MG capsule 24 hr capsule tamsulosin 0.4 mg oral capsule take 1 capsule (0.4 mg) by oral route once daily 1/2 hour following the same meal each day   Active    vitamin D (ERGOCALCIFEROL) 1.25 MG (02635 UT) capsule capsule Take 1 capsule by mouth 1 (One) Time Per Week.     No current  facility-administered medications on file prior to visit.       Past Medical History:   Diagnosis Date    Diabetes     Essential hypertension 11/30/2022    HBP (high blood pressure)     Heart disease     Hyperlipidemia     PVC's (premature ventricular contractions) 10/15/2021       Allergies:  Allergies   Allergen Reactions    Prednisone Mental Status Change and Unknown - High Severity    Codeine Hives       Objective    Objective       Vitals:   Heart Rate:  [55] 55  BP: (185-196)/(82-97) 196/97  Body mass index is 34.75 kg/m².     PHYSICAL EXAM:    General Appearance:   well developed  well nourished  HENT:   oropharynx moist  lips not cyanotic  Neck:  thyroid not enlarged  supple  Respiratory:  no respiratory distress  normal breath sounds  no rales  Cardiovascular:  no jugular venous distention  regular rhythm  apical impulse normal  S1 normal, S2 normal  no S3, no S4   no murmur  no rub, no thrill  carotid pulses normal; no bruit  pedal pulses normal  lower extremity edema: none    Skin:   warm, dry  Psychiatric:  judgement and insight appropriate  normal mood and affect        Result Review:  I have personally reviewed the available results from  [x]  Laboratory  [x]  EKG  [x]  Cardiology  [x]  Medications  [x]  Old records  []  Other:     Procedures       Impression/Plan:  1.  Palpitations/PVCs stable: Continue carvedilol 12.5 mg twice a day.  No palpitations.  2.  Essential hypertension controlled: Continue carvedilol 12.5 mg twice a day.  Continue amlodipine 5 mg once a day.  Monitor blood pressure regularly.  3.  Hyperlipidemia: Continue Lipitor 80 mg once a day.  Monitor lipid and hepatic profile.           Chacorta Streeter MD   11/30/23   10:29 EST

## 2023-11-30 ENCOUNTER — OFFICE VISIT (OUTPATIENT)
Dept: CARDIOLOGY | Facility: CLINIC | Age: 78
End: 2023-11-30
Payer: MEDICARE

## 2023-11-30 VITALS
DIASTOLIC BLOOD PRESSURE: 97 MMHG | WEIGHT: 242.2 LBS | BODY MASS INDEX: 34.67 KG/M2 | HEIGHT: 70 IN | SYSTOLIC BLOOD PRESSURE: 196 MMHG | HEART RATE: 55 BPM

## 2023-11-30 DIAGNOSIS — I10 HYPERTENSION, ESSENTIAL: ICD-10-CM

## 2023-11-30 DIAGNOSIS — R00.2 PALPITATIONS: Primary | ICD-10-CM

## 2023-11-30 DIAGNOSIS — E78.2 HYPERLIPEMIA, MIXED: ICD-10-CM

## 2023-11-30 PROCEDURE — 1160F RVW MEDS BY RX/DR IN RCRD: CPT | Performed by: SPECIALIST

## 2023-11-30 PROCEDURE — 3080F DIAST BP >= 90 MM HG: CPT | Performed by: SPECIALIST

## 2023-11-30 PROCEDURE — 1159F MED LIST DOCD IN RCRD: CPT | Performed by: SPECIALIST

## 2023-11-30 PROCEDURE — 3077F SYST BP >= 140 MM HG: CPT | Performed by: SPECIALIST

## 2023-11-30 PROCEDURE — 99214 OFFICE O/P EST MOD 30 MIN: CPT | Performed by: SPECIALIST

## 2024-06-18 NOTE — PROGRESS NOTES
[facility]  Cardiology progress Note    Patient Name: Jordan Gastelum  : 1945    CHIEF COMPLAINT  Palpitations        Subjective   Subjective     HISTORY OF PRESENT ILLNESS    Jordan Gastelum is a 78 y.o. male with history of palpitations and PVCs.  No further palpitations.    REVIEW OF SYSTEMS    Constitutional:    No fever, no weight loss  Skin:     No rash  Otolaryngeal:    No difficulty swallowing  Cardiovascular: See HPI.  Pulmonary:    No cough, no sputum production    Personal History     Social History:    reports that he quit smoking about 44 years ago. His smoking use included cigarettes. He has never used smokeless tobacco. He reports current alcohol use. He reports that he does not use drugs.    Home Medications:  Current Outpatient Medications on File Prior to Visit   Medication Sig    amLODIPine (NORVASC) 5 MG tablet Take 1 tablet by mouth Daily.    aspirin 81 MG EC tablet Daily.    atorvastatin (LIPITOR) 80 MG tablet atorvastatin 80 mg oral tablet take 1 tablet (80 mg) by oral route once daily   Active    benazepril (LOTENSIN) 40 MG tablet Take 1 tablet by mouth Daily.    carvedilol (COREG) 12.5 MG tablet Take 1 tablet by mouth 2 (Two) Times a Day.    coenzyme Q10 100 MG capsule CoQ-10 100 mg oral capsule take 1 capsule by oral route daily   Active    finasteride (PROSCAR) 5 MG tablet Take 1 tablet by mouth once daily    metFORMIN ER (GLUCOPHAGE-XR) 750 MG 24 hr tablet Take 1 tablet by mouth Daily.    multivitamin with minerals tablet tablet     Omega-3 Fatty Acids (fish oil) 1000 MG capsule capsule     OneTouch Ultra test strip 1 each by Other route Daily.    tamsulosin (FLOMAX) 0.4 MG capsule 24 hr capsule tamsulosin 0.4 mg oral capsule take 1 capsule (0.4 mg) by oral route once daily 1/2 hour following the same meal each day   Active    vitamin D (ERGOCALCIFEROL) 1.25 MG (12692 UT) capsule capsule Take 1 capsule by mouth 1 (One) Time Per Week.     No current  facility-administered medications on file prior to visit.       Past Medical History:   Diagnosis Date    Diabetes     Essential hypertension 11/30/2022    HBP (high blood pressure)     Heart disease     Hyperlipidemia     PVC's (premature ventricular contractions) 10/15/2021       Allergies:  Allergies   Allergen Reactions    Prednisone Mental Status Change and Unknown - High Severity    Codeine Hives       Objective    Objective       Vitals:   Heart Rate:  [59-61] 59  BP: (130-168)/(76-96) 130/76  Body mass index is 34.01 kg/m².     PHYSICAL EXAM:    General Appearance:   well developed  well nourished  HENT:   oropharynx moist  lips not cyanotic  Neck:  thyroid not enlarged  supple  Respiratory:  no respiratory distress  normal breath sounds  no rales  Cardiovascular:  no jugular venous distention  regular rhythm  apical impulse normal  S1 normal, S2 normal  no S3, no S4   no murmur  no rub, no thrill  carotid pulses normal; no bruit  pedal pulses normal  lower extremity edema: none    Skin:   warm, dry  Psychiatric:  judgement and insight appropriate  normal mood and affect        Result Review:  I have personally reviewed the available results from  [x]  Laboratory  [x]  EKG  [x]  Cardiology  [x]  Medications  [x]  Old records  []  Other:     Procedures       Impression/Plan:  1.  Essential hypertension controlled: Continue carvedilol 12.5 mg twice a day.  Continue amlodipine 5 mg once a day.  Monitor blood pressure regularly.  2.  Palpitations/PVCs: Continue carvedilol 12.5 mg twice a day.  No palpitations.  3.  Mixed hyperlipidemia: Continue Lipitor 80 mg once a day.  Monitor lipid and hepatic profile.           Chacorta Streeter MD   06/21/24   09:09 EDT

## 2024-06-21 ENCOUNTER — OFFICE VISIT (OUTPATIENT)
Dept: CARDIOLOGY | Facility: CLINIC | Age: 79
End: 2024-06-21
Payer: MEDICARE

## 2024-06-21 VITALS
SYSTOLIC BLOOD PRESSURE: 130 MMHG | BODY MASS INDEX: 33.93 KG/M2 | DIASTOLIC BLOOD PRESSURE: 76 MMHG | HEART RATE: 59 BPM | HEIGHT: 70 IN | WEIGHT: 237 LBS

## 2024-06-21 DIAGNOSIS — E78.2 HYPERLIPEMIA, MIXED: ICD-10-CM

## 2024-06-21 DIAGNOSIS — I10 HYPERTENSION, ESSENTIAL: Primary | ICD-10-CM

## 2024-06-21 DIAGNOSIS — R00.2 PALPITATIONS: ICD-10-CM

## 2024-06-21 PROCEDURE — 3078F DIAST BP <80 MM HG: CPT | Performed by: SPECIALIST

## 2024-06-21 PROCEDURE — 1160F RVW MEDS BY RX/DR IN RCRD: CPT | Performed by: SPECIALIST

## 2024-06-21 PROCEDURE — 1159F MED LIST DOCD IN RCRD: CPT | Performed by: SPECIALIST

## 2024-06-21 PROCEDURE — 99214 OFFICE O/P EST MOD 30 MIN: CPT | Performed by: SPECIALIST

## 2024-06-21 PROCEDURE — 3075F SYST BP GE 130 - 139MM HG: CPT | Performed by: SPECIALIST

## 2024-08-02 ENCOUNTER — LAB (OUTPATIENT)
Dept: LAB | Facility: HOSPITAL | Age: 79
End: 2024-08-02
Payer: MEDICARE

## 2024-08-02 DIAGNOSIS — R97.20 ELEVATED PROSTATE SPECIFIC ANTIGEN (PSA): ICD-10-CM

## 2024-08-02 DIAGNOSIS — Z12.5 PROSTATE CANCER SCREENING: ICD-10-CM

## 2024-08-02 LAB — PSA SERPL-MCNC: 1.07 NG/ML (ref 0–4)

## 2024-08-02 PROCEDURE — 36415 COLL VENOUS BLD VENIPUNCTURE: CPT

## 2024-08-02 PROCEDURE — G0103 PSA SCREENING: HCPCS

## 2024-08-08 ENCOUNTER — OFFICE VISIT (OUTPATIENT)
Dept: UROLOGY | Facility: CLINIC | Age: 79
End: 2024-08-08
Payer: MEDICARE

## 2024-08-08 VITALS
SYSTOLIC BLOOD PRESSURE: 152 MMHG | WEIGHT: 240 LBS | DIASTOLIC BLOOD PRESSURE: 83 MMHG | HEART RATE: 66 BPM | BODY MASS INDEX: 34.36 KG/M2 | HEIGHT: 70 IN

## 2024-08-08 DIAGNOSIS — N40.0 BENIGN PROSTATIC HYPERPLASIA WITHOUT LOWER URINARY TRACT SYMPTOMS: ICD-10-CM

## 2024-08-08 DIAGNOSIS — Z12.5 PROSTATE CANCER SCREENING: ICD-10-CM

## 2024-08-08 DIAGNOSIS — R97.20 ELEVATED PROSTATE SPECIFIC ANTIGEN (PSA): Primary | ICD-10-CM

## 2024-08-08 LAB — SPECIMEN VOL 24H UR: 0 L

## 2024-08-08 NOTE — PROGRESS NOTES
"Chief Complaint: Elevated PSA    Subjective         Benign Prostatic Hypertrophy      Jordan Gastelum is a 79 y.o. male presents to [facility] UR[facility] to be seen for annual Follow-up on elevated PSA/ BPH.    Patient has remained on finasteride 5mg q day and Tamsulosin  0.4mg q day PCP is refilling this.    Nocturia x 2-3    No hesitancy or straining with urination.    Weak stream at night at times.     No bothersome urinary symptoms.    No UTIs or gross hematuria since we last saw him.    Most recent PSA is 1.070 8/2/24.    Objective     Past Medical History:   Diagnosis Date    Diabetes     Essential hypertension 11/30/2022    HBP (high blood pressure)     Heart disease     Hyperlipidemia     PVC's (premature ventricular contractions) 10/15/2021       Past Surgical History:   Procedure Laterality Date    CATARACT EXTRACTION      COLONOSCOPY      OTHER SURGICAL HISTORY      \"Metal Implant\"    OTHER SURGICAL HISTORY      eye surgery, repaired hole in retina right eye    OTHER SURGICAL HISTORY      tumor removed from neck    OTHER SURGICAL HISTORY      broken fingers right hand, had pins    OTHER SURGICAL HISTORY      Rt ankle, metal plate on each side, 11 screws         Current Outpatient Medications:     amLODIPine (NORVASC) 5 MG tablet, Take 1 tablet by mouth Daily., Disp: 90 tablet, Rfl: 3    aspirin 81 MG EC tablet, Daily., Disp: , Rfl:     atorvastatin (LIPITOR) 80 MG tablet, atorvastatin 80 mg oral tablet take 1 tablet (80 mg) by oral route once daily   Active, Disp: , Rfl:     benazepril (LOTENSIN) 40 MG tablet, Take 1 tablet by mouth Daily., Disp: , Rfl:     carvedilol (COREG) 12.5 MG tablet, Take 1 tablet by mouth 2 (Two) Times a Day., Disp: , Rfl:     coenzyme Q10 100 MG capsule, CoQ-10 100 mg oral capsule take 1 capsule by oral route daily   Active, Disp: , Rfl:     finasteride (PROSCAR) 5 MG tablet, Take 1 tablet by mouth once daily, Disp: 90 tablet, Rfl: 0    metFORMIN ER " "(GLUCOPHAGE-XR) 750 MG 24 hr tablet, Take 1 tablet by mouth Daily., Disp: , Rfl:     multivitamin with minerals tablet tablet, , Disp: , Rfl:     Omega-3 Fatty Acids (fish oil) 1000 MG capsule capsule, , Disp: , Rfl:     OneTouch Ultra test strip, 1 each by Other route Daily., Disp: , Rfl:     tamsulosin (FLOMAX) 0.4 MG capsule 24 hr capsule, tamsulosin 0.4 mg oral capsule take 1 capsule (0.4 mg) by oral route once daily 1/2 hour following the same meal each day   Active, Disp: , Rfl:     vitamin D (ERGOCALCIFEROL) 1.25 MG (84225 UT) capsule capsule, Take 1 capsule by mouth 1 (One) Time Per Week., Disp: , Rfl:     Allergies   Allergen Reactions    Prednisone Mental Status Change and Unknown - High Severity    Codeine Hives        Family History   Problem Relation Age of Onset    Diabetes Mother         Unspecified Type    Diabetes Maternal Grandfather         Unspecified Type    Breast cancer Other     Diabetes Maternal Aunt         Unspecified Type       Social History     Socioeconomic History    Marital status:    Tobacco Use    Smoking status: Former     Current packs/day: 0.00     Types: Cigarettes     Quit date:      Years since quittin.6    Smokeless tobacco: Never    Tobacco comments:     Age: [age] smoked 1 (s) per day. No second hand smoke exposure.   Vaping Use    Vaping status: Never Used   Substance and Sexual Activity    Alcohol use: Yes     Comment: Current some day: Drinks rarely: Caffeine: Current some day: drinks occasionally    Drug use: Never    Sexual activity: Defer       Vital Signs:   /83   Pulse 66   Ht 177.8 cm (70\")   Wt 109 kg (240 lb)   BMI 34.44 kg/m²      Physical Exam     Result Review :   The following data was reviewed by: FELICIANO Hernandez on 2023:  Results for orders placed or performed in visit on 24   Bladder Scan   Result Value Ref Range    Volume 0       PSA          2024    08:21   PSA   PSA 1.070    .     Bladder Scan " interpretation 08/08/2024    Estimation of residual urine via BVI 3000 [facility] Bladder Scan  MA/nurse performing: kim mena rn   Residual Urine: 0 ml  Indication: Elevated prostate specific antigen (PSA)    Prostate cancer screening    Benign prostatic hyperplasia without lower urinary tract symptoms   Position: Supine  Examination: Incremental scanning of the suprapubic area using 2.0 MHz transducer using copious amounts of acoustic gel.   Findings: An anechoic area was demonstrated which represented the bladder, with measurement of residual urine as noted. I inspected this myself. In that the residual urine was stable or insignificant, refer to plan for treatment and plan necessary at this time.          Procedures        Assessment and Plan    Diagnoses and all orders for this visit:    1. Elevated prostate specific antigen (PSA) (Primary)  -     Bladder Scan    2. Prostate cancer screening  -     PSA Screen; Future    3. Benign prostatic hyperplasia without lower urinary tract symptoms      Patient to continue tamsulosin 0.4 mg q day and finasteride 5 mg daily.  He is doing well at this point in time.      PSA has remained within normal limits.      F/u for one more year with PSA if normal then will d/c to PCP.    I spent 10 minutes caring for Jordan on this date of service. This time includes time spent by me in the following activities:reviewing tests, obtaining and/or reviewing a separately obtained history, performing a medically appropriate examination and/or evaluation , counseling and educating the patient/family/caregiver, ordering medications, tests, or procedures, and documenting information in the medical record  Follow Up   Return in about 1 year (around 8/8/2025) for annual f/u with PSA .  Patient was given instructions and counseling regarding his condition or for health maintenance advice. Please see specific information pulled into the AVS if appropriate.         This document has been  electronically signed by Armida Quezada, FELICIANO  August 8, 2024 09:01 EDT

## 2025-01-22 NOTE — PROGRESS NOTES
[facility]  Cardiology progress Note    Patient Name: Jordan Gastelum  : 1945    CHIEF COMPLAINT  Palpitations        Subjective   Subjective     HISTORY OF PRESENT ILLNESS    Jordan Gastelum is a 79 y.o. male with history of palpitations.  No further palpitations.    REVIEW OF SYSTEMS    Constitutional:    No fever, no weight loss  Skin:     No rash  Otolaryngeal:    No difficulty swallowing  Cardiovascular: See HPI.  Pulmonary:    No cough, no sputum production    Personal History     Social History:    reports that he quit smoking about 45 years ago. His smoking use included cigarettes. He has never used smokeless tobacco. He reports current alcohol use. He reports that he does not use drugs.    Home Medications:  Current Outpatient Medications on File Prior to Visit   Medication Sig    amLODIPine (NORVASC) 5 MG tablet Take 1 tablet by mouth Daily.    aspirin 81 MG EC tablet Daily.    atorvastatin (LIPITOR) 80 MG tablet atorvastatin 80 mg oral tablet take 1 tablet (80 mg) by oral route once daily   Active    benazepril (LOTENSIN) 40 MG tablet Take 1 tablet by mouth Daily.    carvedilol (COREG) 12.5 MG tablet Take 1 tablet by mouth 2 (Two) Times a Day.    coenzyme Q10 100 MG capsule CoQ-10 100 mg oral capsule take 1 capsule by oral route daily   Active    ferrous sulfate 324 (65 Fe) MG tablet delayed-release EC tablet Take 1 tablet by mouth Daily With Breakfast.    finasteride (PROSCAR) 5 MG tablet Take 1 tablet by mouth once daily    magnesium chloride ER 64 MG DR tablet Take  by mouth Daily.    metFORMIN ER (GLUCOPHAGE-XR) 750 MG 24 hr tablet Take 1 tablet by mouth Daily.    multivitamin with minerals tablet tablet     Omega-3 Fatty Acids (fish oil) 1000 MG capsule capsule     OneTouch Ultra test strip 1 each by Other route Daily.    tamsulosin (FLOMAX) 0.4 MG capsule 24 hr capsule tamsulosin 0.4 mg oral capsule take 1 capsule (0.4 mg) by oral route once daily 1/2 hour following the same  meal each day   Active    vitamin C (ASCORBIC ACID) 250 MG tablet Take 1 tablet by mouth Daily.    vitamin D (ERGOCALCIFEROL) 1.25 MG (81319 UT) capsule capsule Take 1 capsule by mouth 1 (One) Time Per Week.     No current facility-administered medications on file prior to visit.       Past Medical History:   Diagnosis Date    Diabetes     Essential hypertension 11/30/2022    HBP (high blood pressure)     Heart disease     Hyperlipidemia     PVC's (premature ventricular contractions) 10/15/2021       Allergies:  Allergies   Allergen Reactions    Prednisone Mental Status Change and Unknown - High Severity    Codeine Hives       Objective    Objective       Vitals:   Heart Rate:  [63-65] 63  BP: (167-174)/(101-105) 167/105  Body mass index is 34.15 kg/m².     PHYSICAL EXAM:    General Appearance:   well developed  well nourished  HENT:   oropharynx moist  lips not cyanotic  Neck:  thyroid not enlarged  supple  Respiratory:  no respiratory distress  normal breath sounds  no rales  Cardiovascular:  no jugular venous distention  regular rhythm  apical impulse normal  S1 normal, S2 normal  no S3, no S4   no murmur  no rub, no thrill  carotid pulses normal; no bruit  pedal pulses normal  lower extremity edema: none    Skin:   warm, dry  Psychiatric:  judgement and insight appropriate  normal mood and affect        Result Review:  I have personally reviewed the available results from  [x]  Laboratory  [x]  EKG  [x]  Cardiology  [x]  Medications  [x]  Old records  []  Other:     Procedures    Impression/Plan:  1.  Essential hypertension controlled: Continue carvedilol 12.5 mg twice a day.  Continue amlodipine 5 mg once a day.  Monitor blood pressure regularly.  2.  Palpitations/PVCs: Continue carvedilol 12.5 mg twice a day.  No palpitations.  3.  Mixed hyperlipidemia: Continue Lipitor 80 mg once a day.  Monitor lipid and hepatic profile.                 Chacorta Streeter MD   01/28/25   09:09 EST

## 2025-01-28 ENCOUNTER — OFFICE VISIT (OUTPATIENT)
Dept: CARDIOLOGY | Facility: CLINIC | Age: 80
End: 2025-01-28
Payer: MEDICARE

## 2025-01-28 VITALS
DIASTOLIC BLOOD PRESSURE: 105 MMHG | HEIGHT: 70 IN | WEIGHT: 238 LBS | SYSTOLIC BLOOD PRESSURE: 167 MMHG | BODY MASS INDEX: 34.07 KG/M2 | HEART RATE: 63 BPM

## 2025-01-28 DIAGNOSIS — I10 HYPERTENSION, ESSENTIAL: Primary | ICD-10-CM

## 2025-01-28 DIAGNOSIS — E78.2 HYPERLIPEMIA, MIXED: ICD-10-CM

## 2025-01-28 DIAGNOSIS — R00.2 PALPITATIONS: ICD-10-CM

## 2025-01-28 PROCEDURE — 3077F SYST BP >= 140 MM HG: CPT | Performed by: SPECIALIST

## 2025-01-28 PROCEDURE — 1159F MED LIST DOCD IN RCRD: CPT | Performed by: SPECIALIST

## 2025-01-28 PROCEDURE — 99214 OFFICE O/P EST MOD 30 MIN: CPT | Performed by: SPECIALIST

## 2025-01-28 PROCEDURE — 3080F DIAST BP >= 90 MM HG: CPT | Performed by: SPECIALIST

## 2025-01-28 PROCEDURE — 1160F RVW MEDS BY RX/DR IN RCRD: CPT | Performed by: SPECIALIST

## 2025-01-28 RX ORDER — MULTIVIT WITH MINERALS/LUTEIN
250 TABLET ORAL DAILY
COMMUNITY

## 2025-01-28 RX ORDER — FERROUS SULFATE 324(65)MG
324 TABLET, DELAYED RELEASE (ENTERIC COATED) ORAL
COMMUNITY

## 2025-07-29 NOTE — PROGRESS NOTES
[facility]  Cardiology progress Note    Patient Name: Jordan Gastelum  : 1945    CHIEF COMPLAINT  Palpitations        Subjective   Subjective     HISTORY OF PRESENT ILLNESS    Jordan Gastelum is a 80 y.o. male with history of palpitations.  No chest pain    REVIEW OF SYSTEMS    Constitutional:    No fever, no weight loss  Skin:     No rash  Otolaryngeal:    No difficulty swallowing  Cardiovascular: See HPI.  Pulmonary:    No cough, no sputum production    Personal History     Social History:    reports that he quit smoking about 45 years ago. His smoking use included cigarettes. He has never used smokeless tobacco. He reports current alcohol use. He reports that he does not use drugs.    Home Medications:  Current Outpatient Medications on File Prior to Visit   Medication Sig    amLODIPine (NORVASC) 5 MG tablet Take 1 tablet by mouth Daily.    aspirin 81 MG EC tablet Daily.    atorvastatin (LIPITOR) 80 MG tablet atorvastatin 80 mg oral tablet take 1 tablet (80 mg) by oral route once daily   Active    benazepril (LOTENSIN) 40 MG tablet Take 1 tablet by mouth Daily.    carvedilol (COREG) 12.5 MG tablet Take 1 tablet by mouth 2 (Two) Times a Day.    coenzyme Q10 100 MG capsule CoQ-10 100 mg oral capsule take 1 capsule by oral route daily   Active    ferrous sulfate 324 (65 Fe) MG tablet delayed-release EC tablet Take 1 tablet by mouth Daily With Breakfast.    finasteride (PROSCAR) 5 MG tablet Take 1 tablet by mouth once daily    magnesium chloride ER 64 MG DR tablet Take  by mouth Daily.    metFORMIN ER (GLUCOPHAGE-XR) 750 MG 24 hr tablet Take 1 tablet by mouth Daily.    multivitamin with minerals tablet tablet     Omega-3 Fatty Acids (fish oil) 1000 MG capsule capsule     OneTouch Ultra test strip 1 each by Other route Daily.    tamsulosin (FLOMAX) 0.4 MG capsule 24 hr capsule tamsulosin 0.4 mg oral capsule take 1 capsule (0.4 mg) by oral route once daily 1/2 hour following the same meal each  day   Active    terbinafine (lamiSIL) 250 MG tablet Take 1 tablet by mouth Daily.    vitamin C (ASCORBIC ACID) 250 MG tablet Take 1 tablet by mouth Daily.    vitamin D (ERGOCALCIFEROL) 1.25 MG (16838 UT) capsule capsule Take 1 capsule by mouth 1 (One) Time Per Week.     No current facility-administered medications on file prior to visit.       Past Medical History:   Diagnosis Date    Diabetes     Essential hypertension 11/30/2022    HBP (high blood pressure)     Heart disease     Hyperlipidemia     PVC's (premature ventricular contractions) 10/15/2021       Allergies:  Allergies   Allergen Reactions    Prednisone Mental Status Change and Unknown - High Severity    Codeine Hives       Objective    Objective       Vitals:   Heart Rate:  [68] 68  BP: (164)/(88) 164/88  Body mass index is 34.84 kg/m².     PHYSICAL EXAM:    General Appearance:   well developed  well nourished  HENT:   oropharynx moist  lips not cyanotic  Neck:  thyroid not enlarged  supple  Respiratory:  no respiratory distress  normal breath sounds  no rales  Cardiovascular:  no jugular venous distention  regular rhythm  apical impulse normal  S1 normal, S2 normal  no S3, no S4   no murmur  no rub, no thrill  carotid pulses normal; no bruit  pedal pulses normal  lower extremity edema: none    Skin:   warm, dry  Psychiatric:  judgement and insight appropriate  normal mood and affect        Result Review:  I have personally reviewed the available results from  [x]  Laboratory  [x]  EKG  [x]  Cardiology  [x]  Medications  [x]  Old records  []  Other:       ECG 12 Lead    Date/Time: 7/31/2025 9:56 AM  Performed by: Chacorta Streeter MD    Authorized by: Chacorta Streeter MD  Comparison: compared with previous ECG   Similar to previous ECG  Rhythm: sinus rhythm  Rate: normal  QRS axis: normal  Other findings: non-specific ST-T wave changes             Impression/Plan:  1.  Essential hypertension controlled: Continue carvedilol 12.5 mg twice a day.   Continue amlodipine 5 mg once a day.  Monitor blood pressure regularly.  Blood pressure controlled at home.  2.  Palpitations/PVCs: Continue carvedilol 12.5 mg twice a day.  No palpitations.  3.  Mixed hyperlipidemia: Continue Lipitor 80 mg once a day.  Monitor lipid and hepatic profile.                 Chacorta Streeter MD   07/31/25   09:56 EDT

## 2025-07-31 ENCOUNTER — OFFICE VISIT (OUTPATIENT)
Dept: CARDIOLOGY | Facility: CLINIC | Age: 80
End: 2025-07-31
Payer: MEDICARE

## 2025-07-31 VITALS
SYSTOLIC BLOOD PRESSURE: 164 MMHG | HEART RATE: 68 BPM | HEIGHT: 70 IN | WEIGHT: 242.8 LBS | DIASTOLIC BLOOD PRESSURE: 88 MMHG | BODY MASS INDEX: 34.76 KG/M2

## 2025-07-31 DIAGNOSIS — E78.2 HYPERLIPEMIA, MIXED: ICD-10-CM

## 2025-07-31 DIAGNOSIS — I10 HYPERTENSION, ESSENTIAL: Primary | ICD-10-CM

## 2025-07-31 DIAGNOSIS — R00.2 PALPITATIONS: ICD-10-CM

## 2025-07-31 PROCEDURE — 93000 ELECTROCARDIOGRAM COMPLETE: CPT | Performed by: SPECIALIST

## 2025-07-31 PROCEDURE — 3077F SYST BP >= 140 MM HG: CPT | Performed by: SPECIALIST

## 2025-07-31 PROCEDURE — 3079F DIAST BP 80-89 MM HG: CPT | Performed by: SPECIALIST

## 2025-07-31 PROCEDURE — 99214 OFFICE O/P EST MOD 30 MIN: CPT | Performed by: SPECIALIST

## 2025-07-31 PROCEDURE — 1159F MED LIST DOCD IN RCRD: CPT | Performed by: SPECIALIST

## 2025-07-31 PROCEDURE — 1160F RVW MEDS BY RX/DR IN RCRD: CPT | Performed by: SPECIALIST

## 2025-07-31 RX ORDER — TERBINAFINE HYDROCHLORIDE 250 MG/1
250 TABLET ORAL DAILY
COMMUNITY
Start: 2025-07-23 | End: 2025-10-21

## 2025-08-05 ENCOUNTER — LAB (OUTPATIENT)
Dept: LAB | Facility: HOSPITAL | Age: 80
End: 2025-08-05
Payer: MEDICARE

## 2025-08-05 DIAGNOSIS — Z12.5 PROSTATE CANCER SCREENING: ICD-10-CM

## 2025-08-05 LAB — PSA SERPL-MCNC: 1.36 NG/ML (ref 0–4)

## 2025-08-05 PROCEDURE — G0103 PSA SCREENING: HCPCS

## 2025-08-05 PROCEDURE — 36415 COLL VENOUS BLD VENIPUNCTURE: CPT

## 2025-08-12 ENCOUNTER — OFFICE VISIT (OUTPATIENT)
Dept: UROLOGY | Age: 80
End: 2025-08-12
Payer: MEDICARE

## 2025-08-12 VITALS — BODY MASS INDEX: 34.72 KG/M2 | WEIGHT: 242.51 LBS | RESPIRATION RATE: 16 BRPM | HEIGHT: 70 IN

## 2025-08-12 DIAGNOSIS — N40.0 BENIGN PROSTATIC HYPERPLASIA WITHOUT LOWER URINARY TRACT SYMPTOMS: Primary | ICD-10-CM

## 2025-08-12 PROBLEM — R39.11 HESITANCY OF MICTURITION: Status: RESOLVED | Noted: 2022-02-18 | Resolved: 2025-08-12

## 2025-08-12 PROBLEM — E11.9 DIABETES: Status: RESOLVED | Noted: 2022-02-18 | Resolved: 2025-08-12

## 2025-08-12 PROBLEM — H34.8192 CENTRAL RETINAL VEIN OCCLUSION: Status: ACTIVE | Noted: 2025-08-12

## 2025-08-12 PROBLEM — N40.1 LOWER URINARY TRACT SYMPTOMS DUE TO BENIGN PROSTATIC HYPERPLASIA: Status: RESOLVED | Noted: 2022-02-18 | Resolved: 2025-08-12

## 2025-08-12 PROBLEM — I50.9 HEART FAILURE: Status: ACTIVE | Noted: 2025-08-12

## 2025-08-12 LAB
BILIRUB BLD-MCNC: NEGATIVE MG/DL
CLARITY, POC: CLEAR
COLOR UR: YELLOW
EXPIRATION DATE: NORMAL
GLUCOSE UR STRIP-MCNC: NEGATIVE MG/DL
KETONES UR QL: NEGATIVE
LEUKOCYTE EST, POC: NEGATIVE
Lab: NORMAL
NITRITE UR-MCNC: NEGATIVE MG/ML
PH UR: 5.5 [PH] (ref 5–8)
PROT UR STRIP-MCNC: NEGATIVE MG/DL
RBC # UR STRIP: NEGATIVE /UL
SP GR UR: 1.02 (ref 1–1.03)
URINE VOLUME: 0
UROBILINOGEN UR QL: NORMAL